# Patient Record
Sex: FEMALE | Race: BLACK OR AFRICAN AMERICAN | Employment: OTHER | URBAN - METROPOLITAN AREA
[De-identification: names, ages, dates, MRNs, and addresses within clinical notes are randomized per-mention and may not be internally consistent; named-entity substitution may affect disease eponyms.]

---

## 2018-05-19 ENCOUNTER — HOSPITAL ENCOUNTER (EMERGENCY)
Facility: HOSPITAL | Age: 83
Discharge: HOME | End: 2018-05-19
Attending: EMERGENCY MEDICINE
Payer: MEDICARE

## 2018-05-19 VITALS
DIASTOLIC BLOOD PRESSURE: 56 MMHG | HEIGHT: 62 IN | BODY MASS INDEX: 27.79 KG/M2 | OXYGEN SATURATION: 98 % | HEART RATE: 60 BPM | TEMPERATURE: 99 F | RESPIRATION RATE: 18 BRPM | WEIGHT: 151 LBS | SYSTOLIC BLOOD PRESSURE: 170 MMHG

## 2018-05-19 DIAGNOSIS — K59.00 CONSTIPATION, UNSPECIFIED CONSTIPATION TYPE: Primary | ICD-10-CM

## 2018-05-19 PROCEDURE — 99283 EMERGENCY DEPT VISIT LOW MDM: CPT

## 2018-05-19 NOTE — ED INITIAL ASSESSMENT (HPI)
Pt brought in by EMS for rectal. Pt reports pain around rectum starting yesterday, took norco at 0250 without relief. Denies blood.

## 2018-05-19 NOTE — ED NOTES
NH aware of pt return. Report given to medics. Pt safe to d/c home per MD, able to dress self.  D/C teaching completed, pt verbalizes understanding, ambulatory with steady gait to exit

## 2018-05-19 NOTE — ED PROVIDER NOTES
Patient Seen in: [facility] Emergency Department    History   Patient presents with:  Pain (neurologic)      HPI    Patient presents to the ED complaining of rectal pain.   She states that the pain started yesterday and has been taking Norco withou distress. Abdominal: Soft. She exhibits no distension. There is no tenderness. Genitourinary:   Genitourinary Comments: No evidence for hemorrhoids, fissures or specific tenderness on digital rectal examination.   There is no fluctuance of the rectal ca back to her care facility. Additional verbal instructions were given and discussed with the patient and/or caregiver.     Condition upon leaving the department: Stable    Disposition and Plan     Clinical Impression:  Constipation, unspecified constipati

## 2018-07-26 ENCOUNTER — APPOINTMENT (OUTPATIENT)
Dept: GENERAL RADIOLOGY | Facility: HOSPITAL | Age: 83
DRG: 291 | End: 2018-07-26
Attending: EMERGENCY MEDICINE
Payer: MEDICARE

## 2018-07-26 ENCOUNTER — HOSPITAL ENCOUNTER (INPATIENT)
Facility: HOSPITAL | Age: 83
LOS: 3 days | Discharge: NURSING, REHAB OR LONG-TERM CARE | DRG: 291 | End: 2018-08-01
Attending: EMERGENCY MEDICINE | Admitting: INTERNAL MEDICINE
Payer: MEDICARE

## 2018-07-26 DIAGNOSIS — R07.9 ACUTE CHEST PAIN: Primary | ICD-10-CM

## 2018-07-26 DIAGNOSIS — N30.01 ACUTE CYSTITIS WITH HEMATURIA: ICD-10-CM

## 2018-07-26 DIAGNOSIS — I50.9 ACUTE ON CHRONIC CONGESTIVE HEART FAILURE, UNSPECIFIED HEART FAILURE TYPE (HCC): ICD-10-CM

## 2018-07-26 DIAGNOSIS — R77.8 ELEVATED TROPONIN: ICD-10-CM

## 2018-07-26 PROCEDURE — 84484 ASSAY OF TROPONIN QUANT: CPT | Performed by: EMERGENCY MEDICINE

## 2018-07-26 PROCEDURE — 83880 ASSAY OF NATRIURETIC PEPTIDE: CPT | Performed by: EMERGENCY MEDICINE

## 2018-07-26 PROCEDURE — 85025 COMPLETE CBC W/AUTO DIFF WBC: CPT | Performed by: EMERGENCY MEDICINE

## 2018-07-26 PROCEDURE — 93005 ELECTROCARDIOGRAM TRACING: CPT

## 2018-07-26 PROCEDURE — 93010 ELECTROCARDIOGRAM REPORT: CPT | Performed by: EMERGENCY MEDICINE

## 2018-07-26 PROCEDURE — 99285 EMERGENCY DEPT VISIT HI MDM: CPT

## 2018-07-26 PROCEDURE — 80048 BASIC METABOLIC PNL TOTAL CA: CPT | Performed by: EMERGENCY MEDICINE

## 2018-07-26 PROCEDURE — 96374 THER/PROPH/DIAG INJ IV PUSH: CPT

## 2018-07-26 PROCEDURE — 71045 X-RAY EXAM CHEST 1 VIEW: CPT | Performed by: EMERGENCY MEDICINE

## 2018-07-26 PROCEDURE — 96375 TX/PRO/DX INJ NEW DRUG ADDON: CPT

## 2018-07-26 PROCEDURE — 80061 LIPID PANEL: CPT | Performed by: EMERGENCY MEDICINE

## 2018-07-27 ENCOUNTER — APPOINTMENT (OUTPATIENT)
Dept: CT IMAGING | Facility: HOSPITAL | Age: 83
DRG: 291 | End: 2018-07-27
Attending: EMERGENCY MEDICINE
Payer: MEDICARE

## 2018-07-27 PROBLEM — N30.01 ACUTE CYSTITIS WITH HEMATURIA: Status: ACTIVE | Noted: 2018-07-27

## 2018-07-27 PROBLEM — R07.9 ACUTE CHEST PAIN: Status: ACTIVE | Noted: 2018-07-27

## 2018-07-27 PROBLEM — R77.8 ELEVATED TROPONIN: Status: ACTIVE | Noted: 2018-07-27

## 2018-07-27 PROBLEM — I50.9 ACUTE ON CHRONIC CONGESTIVE HEART FAILURE, UNSPECIFIED HEART FAILURE TYPE (HCC): Status: ACTIVE | Noted: 2018-07-27

## 2018-07-27 LAB
ANION GAP SERPL CALC-SCNC: 8 MMOL/L (ref 0–18)
BASOPHILS # BLD: 0.1 K/UL (ref 0–0.2)
BASOPHILS NFR BLD: 1 %
BILIRUB UR QL: NEGATIVE
BNP SERPL-MCNC: 2373 PG/ML (ref 0–100)
BUN SERPL-MCNC: 17 MG/DL (ref 8–20)
BUN/CREAT SERPL: 5.4 (ref 10–20)
CALCIUM SERPL-MCNC: 9.3 MG/DL (ref 8.5–10.5)
CHLORIDE SERPL-SCNC: 105 MMOL/L (ref 95–110)
CHOLEST SERPL-MCNC: 132 MG/DL (ref 110–200)
CO2 SERPL-SCNC: 31 MMOL/L (ref 22–32)
COLOR UR: YELLOW
CREAT SERPL-MCNC: 3.16 MG/DL (ref 0.5–1.5)
EOSINOPHIL # BLD: 0.2 K/UL (ref 0–0.7)
EOSINOPHIL NFR BLD: 3 %
ERYTHROCYTE [DISTWIDTH] IN BLOOD BY AUTOMATED COUNT: 17.4 % (ref 11–15)
GLUCOSE BLDC GLUCOMTR-MCNC: 102 MG/DL (ref 70–99)
GLUCOSE BLDC GLUCOMTR-MCNC: 130 MG/DL (ref 70–99)
GLUCOSE BLDC GLUCOMTR-MCNC: 76 MG/DL (ref 70–99)
GLUCOSE BLDC GLUCOMTR-MCNC: 79 MG/DL (ref 70–99)
GLUCOSE BLDC GLUCOMTR-MCNC: 84 MG/DL (ref 70–99)
GLUCOSE SERPL-MCNC: 136 MG/DL (ref 70–99)
GLUCOSE UR-MCNC: 50 MG/DL
HBA1C MFR BLD: 4.4 % (ref 4–6)
HCT VFR BLD AUTO: 34.2 % (ref 35–48)
HDLC SERPL-MCNC: 44 MG/DL
HGB BLD-MCNC: 10.8 G/DL (ref 12–16)
IRON SATN MFR SERPL: 17 % (ref 15–50)
IRON SERPL-MCNC: 28 MCG/DL (ref 28–170)
KETONES UR-MCNC: NEGATIVE MG/DL
LDLC SERPL CALC-MCNC: 54 MG/DL (ref 0–99)
LYMPHOCYTES # BLD: 0.6 K/UL (ref 1–4)
LYMPHOCYTES NFR BLD: 9 %
MCH RBC QN AUTO: 28.9 PG (ref 27–32)
MCHC RBC AUTO-ENTMCNC: 31.6 G/DL (ref 32–37)
MCV RBC AUTO: 91.3 FL (ref 80–100)
MONOCYTES # BLD: 0.5 K/UL (ref 0–1)
MONOCYTES NFR BLD: 7 %
NEUTROPHILS # BLD AUTO: 5.3 K/UL (ref 1.8–7.7)
NEUTROPHILS NFR BLD: 79 %
NITRITE UR QL STRIP.AUTO: NEGATIVE
NONHDLC SERPL-MCNC: 88 MG/DL
OSMOLALITY UR CALC.SUM OF ELEC: 302 MOSM/KG (ref 275–295)
PH UR: 9 [PH] (ref 5–8)
PLATELET # BLD AUTO: 139 K/UL (ref 140–400)
PMV BLD AUTO: 9.1 FL (ref 7.4–10.3)
POTASSIUM SERPL-SCNC: 3.9 MMOL/L (ref 3.3–5.1)
PROT UR-MCNC: 100 MG/DL
RBC # BLD AUTO: 3.74 M/UL (ref 3.7–5.4)
RBC #/AREA URNS AUTO: 33 /HPF
SODIUM SERPL-SCNC: 144 MMOL/L (ref 136–144)
SP GR UR STRIP: 1.01 (ref 1–1.03)
TIBC SERPL-MCNC: 165 MCG/DL (ref 228–428)
TRANSFERRIN SERPL-MCNC: 125 MG/DL (ref 192–382)
TRIGL SERPL-MCNC: 170 MG/DL (ref 1–149)
TROPONIN I SERPL-MCNC: 0.04 NG/ML (ref ?–0.03)
TROPONIN I SERPL-MCNC: 0.04 NG/ML (ref ?–0.03)
UROBILINOGEN UR STRIP-ACNC: <2
VIT C UR-MCNC: NEGATIVE MG/DL
WBC # BLD AUTO: 6.7 K/UL (ref 4–11)
WBC #/AREA URNS AUTO: 167 /HPF

## 2018-07-27 PROCEDURE — 70450 CT HEAD/BRAIN W/O DYE: CPT | Performed by: EMERGENCY MEDICINE

## 2018-07-27 PROCEDURE — 87086 URINE CULTURE/COLONY COUNT: CPT | Performed by: EMERGENCY MEDICINE

## 2018-07-27 PROCEDURE — 83540 ASSAY OF IRON: CPT | Performed by: FAMILY MEDICINE

## 2018-07-27 PROCEDURE — 93005 ELECTROCARDIOGRAM TRACING: CPT

## 2018-07-27 PROCEDURE — 93010 ELECTROCARDIOGRAM REPORT: CPT | Performed by: INTERNAL MEDICINE

## 2018-07-27 PROCEDURE — A4216 STERILE WATER/SALINE, 10 ML: HCPCS

## 2018-07-27 PROCEDURE — 83036 HEMOGLOBIN GLYCOSYLATED A1C: CPT | Performed by: INTERNAL MEDICINE

## 2018-07-27 PROCEDURE — 81001 URINALYSIS AUTO W/SCOPE: CPT | Performed by: EMERGENCY MEDICINE

## 2018-07-27 PROCEDURE — 82962 GLUCOSE BLOOD TEST: CPT

## 2018-07-27 PROCEDURE — 84466 ASSAY OF TRANSFERRIN: CPT | Performed by: FAMILY MEDICINE

## 2018-07-27 PROCEDURE — 84484 ASSAY OF TROPONIN QUANT: CPT | Performed by: INTERNAL MEDICINE

## 2018-07-27 RX ORDER — ATORVASTATIN CALCIUM 40 MG/1
80 TABLET, FILM COATED ORAL NIGHTLY
Status: DISCONTINUED | OUTPATIENT
Start: 2018-07-27 | End: 2018-08-01

## 2018-07-27 RX ORDER — LEVOCARNITINE 330 MG/1
330 TABLET ORAL DAILY
Status: DISCONTINUED | OUTPATIENT
Start: 2018-07-27 | End: 2018-07-27 | Stop reason: SDUPTHER

## 2018-07-27 RX ORDER — AMLODIPINE BESYLATE 5 MG/1
5 TABLET ORAL DAILY
Status: DISCONTINUED | OUTPATIENT
Start: 2018-07-27 | End: 2018-07-28

## 2018-07-27 RX ORDER — TRAMADOL HYDROCHLORIDE 50 MG/1
50 TABLET ORAL EVERY 12 HOURS PRN
Status: DISCONTINUED | OUTPATIENT
Start: 2018-07-27 | End: 2018-08-01

## 2018-07-27 RX ORDER — GABAPENTIN 300 MG/1
300 CAPSULE ORAL 3 TIMES DAILY
Status: ON HOLD | COMMUNITY
End: 2018-07-30

## 2018-07-27 RX ORDER — ASPIRIN 81 MG/1
81 TABLET, CHEWABLE ORAL DAILY
Status: DISCONTINUED | OUTPATIENT
Start: 2018-07-27 | End: 2018-08-01

## 2018-07-27 RX ORDER — SENNOSIDES 8.8 MG/5ML
5 LIQUID ORAL 2 TIMES DAILY
COMMUNITY

## 2018-07-27 RX ORDER — CHOLECALCIFEROL (VITAMIN D3) 1250 MCG
50000 CAPSULE ORAL WEEKLY
COMMUNITY

## 2018-07-27 RX ORDER — LEVOCARNITINE 1 G/10ML
330 SOLUTION ORAL DAILY
Status: ON HOLD | COMMUNITY
End: 2019-03-01

## 2018-07-27 RX ORDER — FUROSEMIDE 10 MG/ML
40 INJECTION INTRAMUSCULAR; INTRAVENOUS ONCE
Status: COMPLETED | OUTPATIENT
Start: 2018-07-27 | End: 2018-07-27

## 2018-07-27 RX ORDER — HEPARIN SODIUM 5000 [USP'U]/ML
5000 INJECTION, SOLUTION INTRAVENOUS; SUBCUTANEOUS EVERY 12 HOURS SCHEDULED
Status: DISCONTINUED | OUTPATIENT
Start: 2018-07-27 | End: 2018-08-01

## 2018-07-27 RX ORDER — LEVOCARNITINE 1 G/10ML
330 SOLUTION ORAL DAILY
Status: DISCONTINUED | OUTPATIENT
Start: 2018-07-27 | End: 2018-08-01

## 2018-07-27 RX ORDER — IPRATROPIUM BROMIDE AND ALBUTEROL SULFATE 2.5; .5 MG/3ML; MG/3ML
3 SOLUTION RESPIRATORY (INHALATION) EVERY 4 HOURS PRN
Status: ON HOLD | COMMUNITY
End: 2019-03-01

## 2018-07-27 RX ORDER — ATORVASTATIN CALCIUM 40 MG/1
40 TABLET, FILM COATED ORAL NIGHTLY
Status: DISCONTINUED | OUTPATIENT
Start: 2018-07-27 | End: 2018-07-27

## 2018-07-27 RX ORDER — HYDRALAZINE HYDROCHLORIDE 20 MG/ML
20 INJECTION INTRAMUSCULAR; INTRAVENOUS EVERY 6 HOURS PRN
Status: DISCONTINUED | OUTPATIENT
Start: 2018-07-27 | End: 2018-08-01

## 2018-07-27 RX ORDER — 0.9 % SODIUM CHLORIDE 0.9 %
VIAL (ML) INJECTION
Status: COMPLETED
Start: 2018-07-27 | End: 2018-07-27

## 2018-07-27 RX ORDER — FUROSEMIDE 10 MG/ML
40 INJECTION INTRAMUSCULAR; INTRAVENOUS
Status: DISCONTINUED | OUTPATIENT
Start: 2018-07-27 | End: 2018-07-28

## 2018-07-27 RX ORDER — ATORVASTATIN CALCIUM 40 MG/1
40 TABLET, FILM COATED ORAL NIGHTLY
Status: ON HOLD | COMMUNITY
End: 2018-08-01

## 2018-07-27 RX ORDER — FUROSEMIDE 10 MG/ML
40 INJECTION INTRAMUSCULAR; INTRAVENOUS DAILY
Status: DISCONTINUED | OUTPATIENT
Start: 2018-07-27 | End: 2018-07-27

## 2018-07-27 RX ORDER — ASPIRIN 81 MG/1
324 TABLET, CHEWABLE ORAL ONCE
Status: COMPLETED | OUTPATIENT
Start: 2018-07-27 | End: 2018-07-27

## 2018-07-27 RX ORDER — GUAIFENESIN 100 MG/5ML
100 SOLUTION ORAL EVERY 4 HOURS PRN
COMMUNITY

## 2018-07-27 RX ORDER — TRAMADOL HYDROCHLORIDE 50 MG/1
50 TABLET ORAL EVERY 6 HOURS PRN
Status: ON HOLD | COMMUNITY
End: 2019-03-29

## 2018-07-27 RX ORDER — IPRATROPIUM BROMIDE AND ALBUTEROL SULFATE 2.5; .5 MG/3ML; MG/3ML
3 SOLUTION RESPIRATORY (INHALATION) EVERY 4 HOURS PRN
Status: DISCONTINUED | OUTPATIENT
Start: 2018-07-27 | End: 2018-08-01

## 2018-07-27 RX ORDER — 0.9 % SODIUM CHLORIDE 0.9 %
3 VIAL (ML) INJECTION AS NEEDED
Status: DISCONTINUED | OUTPATIENT
Start: 2018-07-27 | End: 2018-08-01

## 2018-07-27 RX ORDER — POLYETHYLENE GLYCOL 3350 17 G/17G
17 POWDER, FOR SOLUTION ORAL DAILY
COMMUNITY

## 2018-07-27 NOTE — CM/SW NOTE
SW self-referred to meet with patient due to case findings and diagnosis. Pt is from [facility]. SW sent updates via [employer]/[place]. / to remain available for support and/or discharge planning.      Renato Downs  X1

## 2018-07-27 NOTE — PLAN OF CARE
CARDIOLOGY AND NEPHROLOGY CONSULTS TODAY, DIALYSIS SCHEDULED FOR TOMORROW, 2D ECHO ORDERED FOR TOMORROW, MEDICATION ADJUSTMENTS,  [facility]

## 2018-07-27 NOTE — CONSULTS
[facility]    Report of Consultation    Sabine Christian Patient Status:  Observation    1935 MRN [id number]   Location [facility] 3W/SW Attending Gina Ribeiro, *   Hosp Day # 0 PCP No primary care provider on file. times per day   levOCARNitine (CARNITOR) 1 GM/10ML solution 330 mg 330 mg Oral Daily       Prescriptions Prior to Admission:  TraMADol HCl 50 MG Oral Tab Take 50 mg by mouth every 6 (six) hours as needed for Pain.   levOCARNitine 1 GM/10ML Oral Solution Ta 07/26/2018    07/26/2018   K 3.9 07/26/2018    07/26/2018   CO2 31 07/26/2018    (H) 07/26/2018   CA 9.3 07/26/2018   ALB 3.8 11/30/2016   ALKPHO 295 (H) 11/30/2016   TP 7.6 11/30/2016   AST 14 (L) 11/30/2016   ALT 10 (L) 11/30/2016   TR admitted for Chest pain. 1.  ESRD on HD, TTS       2. Anemia of CKD      3. Acute chest pain      4. Elevated troponin      5. Acute on chronic congestive heart failure, unspecified heart failure type ([address]      6.   Acute cystitis with hematuria

## 2018-07-27 NOTE — ED PROVIDER NOTES
Patient endorsed pending urinalysis and CT prior to admission. CT with no acute intracranial findings other than possible sinusitis. Urinalysis with evidence for UTI, will cover Rocephin.     Preliminary Radiology Report  [facility], [facility]  [phone]

## 2018-07-27 NOTE — PROGRESS NOTES
Critical access hospital Pharmacy Note:  Renal Dose Adjustment for Tramadol Sravanthi Perez    Bharat Villatoro has been prescribed Tramadol (ULTRAM) 50 mg orally every 6 hours as needed for pain. Estimated Creatinine Clearance: 12.4 mL/min (A) (based on SCr of 3.16 mg/dL (H)).     Her

## 2018-07-27 NOTE — CONSULTS
[facility]    Report of Consultation    Kristyn Limon Patient Status:  Observation    1935 MRN [id number]   Location [facility] 3W/SW Attending Khadra Valle, *   Hosp Day # 0 PCP No primary care provider on file. injection 5,000 Units 5,000 Units Subcutaneous 2 times per day   levOCARNitine (CARNITOR) 1 GM/10ML solution 330 mg 330 mg Oral Daily   Sodium Chloride 0.9 % solution 3 mL 3 mL Intravenous PRN       Prescriptions Prior to Admission:  TraMADol HCl 50 MG Ora (Porcine)  5,000 Units Subcutaneous 2 times per day   • levOCARNitine  330 mg Oral Daily       Continuous Infusions:     General appearance:  alert, appears stated age and cooperative  Pulmonary: B/L wheezing  Cardiovascular: S1, S2 normal, no murmur, clic at 6:57          Xr Chest Ap Portable  (cpt=71045)    Result Date: 7/27/2018  CONCLUSION:  1. Limited inspiration. 2. Prominent markings perhaps secondary to the poor inspiration. CHF? 3. Cardiomegaly. 4. Atherosclerosis. 5. Demineralization. 6. Scoliosis.

## 2018-07-27 NOTE — ED PROVIDER NOTES
Patient Seen in: [facility] AND [facility] Emergency Department    History   Patient presents with:  Chest Pain Angina (cardiovascular)    Stated Complaint:     HPI    Patient is an 27-year-old female from [facility] who presents with chest pain started earlier th distress, awake and alert  HEENT: MMM, EOMI, PERRL  Neck: supple, non tender, no jvd  CV: RRR, no murmurs. +chest wall port  Resp: +audible expiratory wheezing  Ab: soft, nontender, no distension  Extremities: limited movement due to pain LUE. +left BKA. 0033  ------------------------------------------------------------      MDM           Chest, one view, at 2323 hrs. Comparison: 6/9/16    IMPRESSION:    Lung volumes are low, limiting sensitivity and specificity. Status post median sternotomy.   Mild ca

## 2018-07-27 NOTE — ED INITIAL ASSESSMENT (HPI)
Pt c/o CP at 0945 this AM that lasted 3-4 minutes, described as 5/10 and sharp. Pt denies current CP/SOB. No distress noted. Pt a/o x4/4.

## 2018-07-27 NOTE — H&P
[facility] Patient Status:  Observation    1935 MRN [id number]   Location [facility] 3W/SW Attending Ciarra Sahni, *   Hosp Day # 0 PCP No primary care provider on file.      Aiden MG/5ML Oral Solution Take 100 mg by mouth every 4 (four) hours as needed. gabapentin 300 MG Oral Cap Take 300 mg by mouth 3 (three) times daily. PEG 3350 Oral Powd Pack Take 17 g by mouth daily.    ipratropium-albuterol 0.5-2.5 (3) MG/3ML Inhalation Sol 7/27/2018  ECG Report  Interpretation  --------------------------     Ekg 12-lead    Result Date: 7/26/2018  ECG Report  Interpretation  --------------------------       Assessment/Plan:       Atypical chest pain  –EKG without acute changes  –Stable tropon

## 2018-07-27 NOTE — CM/SW NOTE
SW self-referred to meet with patient due to case findings and diagnosis. Pt is from [address] [place] [address] since [facility]. FRANCHESCA spoke with pt's daughter Derrick Kim [phone] who states that she wants her mother transferred to [facility] [phone].    P

## 2018-07-28 ENCOUNTER — APPOINTMENT (OUTPATIENT)
Dept: CV DIAGNOSTICS | Facility: HOSPITAL | Age: 83
DRG: 291 | End: 2018-07-28
Attending: HOSPITALIST
Payer: MEDICARE

## 2018-07-28 LAB
ANION GAP SERPL CALC-SCNC: 10 MMOL/L (ref 0–18)
BASOPHILS # BLD: 0.1 K/UL (ref 0–0.2)
BASOPHILS NFR BLD: 1 %
BUN SERPL-MCNC: 24 MG/DL (ref 8–20)
BUN/CREAT SERPL: 5.7 (ref 10–20)
CALCIUM SERPL-MCNC: 9.3 MG/DL (ref 8.5–10.5)
CHLORIDE SERPL-SCNC: 105 MMOL/L (ref 95–110)
CO2 SERPL-SCNC: 29 MMOL/L (ref 22–32)
CREAT SERPL-MCNC: 4.18 MG/DL (ref 0.5–1.5)
EOSINOPHIL # BLD: 0.4 K/UL (ref 0–0.7)
EOSINOPHIL NFR BLD: 7 %
ERYTHROCYTE [DISTWIDTH] IN BLOOD BY AUTOMATED COUNT: 17.6 % (ref 11–15)
GLUCOSE BLDC GLUCOMTR-MCNC: 120 MG/DL (ref 70–99)
GLUCOSE BLDC GLUCOMTR-MCNC: 81 MG/DL (ref 70–99)
GLUCOSE BLDC GLUCOMTR-MCNC: 90 MG/DL (ref 70–99)
GLUCOSE SERPL-MCNC: 75 MG/DL (ref 70–99)
HCT VFR BLD AUTO: 30.8 % (ref 35–48)
HGB BLD-MCNC: 9.8 G/DL (ref 12–16)
LYMPHOCYTES # BLD: 1.1 K/UL (ref 1–4)
LYMPHOCYTES NFR BLD: 21 %
MCH RBC QN AUTO: 29.4 PG (ref 27–32)
MCHC RBC AUTO-ENTMCNC: 31.9 G/DL (ref 32–37)
MCV RBC AUTO: 92.3 FL (ref 80–100)
MONOCYTES # BLD: 0.6 K/UL (ref 0–1)
MONOCYTES NFR BLD: 12 %
NEUTROPHILS # BLD AUTO: 3.1 K/UL (ref 1.8–7.7)
NEUTROPHILS NFR BLD: 60 %
OSMOLALITY UR CALC.SUM OF ELEC: 301 MOSM/KG (ref 275–295)
PLATELET # BLD AUTO: 166 K/UL (ref 140–400)
PMV BLD AUTO: 9.7 FL (ref 7.4–10.3)
POTASSIUM SERPL-SCNC: 4.6 MMOL/L (ref 3.3–5.1)
RBC # BLD AUTO: 3.33 M/UL (ref 3.7–5.4)
SODIUM SERPL-SCNC: 144 MMOL/L (ref 136–144)
WBC # BLD AUTO: 5.2 K/UL (ref 4–11)

## 2018-07-28 PROCEDURE — 90935 HEMODIALYSIS ONE EVALUATION: CPT

## 2018-07-28 PROCEDURE — 93306 TTE W/DOPPLER COMPLETE: CPT | Performed by: HOSPITALIST

## 2018-07-28 PROCEDURE — B246YZZ ULTRASONOGRAPHY OF RIGHT AND LEFT HEART USING OTHER CONTRAST: ICD-10-PCS | Performed by: INTERNAL MEDICINE

## 2018-07-28 PROCEDURE — 5A1D70Z PERFORMANCE OF URINARY FILTRATION, INTERMITTENT, LESS THAN 6 HOURS PER DAY: ICD-10-PCS | Performed by: INTERNAL MEDICINE

## 2018-07-28 PROCEDURE — 82962 GLUCOSE BLOOD TEST: CPT

## 2018-07-28 PROCEDURE — 80048 BASIC METABOLIC PNL TOTAL CA: CPT | Performed by: INTERNAL MEDICINE

## 2018-07-28 PROCEDURE — 85025 COMPLETE CBC W/AUTO DIFF WBC: CPT | Performed by: INTERNAL MEDICINE

## 2018-07-28 PROCEDURE — A4216 STERILE WATER/SALINE, 10 ML: HCPCS | Performed by: INTERNAL MEDICINE

## 2018-07-28 RX ORDER — FUROSEMIDE 10 MG/ML
80 INJECTION INTRAMUSCULAR; INTRAVENOUS
Status: DISCONTINUED | OUTPATIENT
Start: 2018-07-29 | End: 2018-07-31

## 2018-07-28 RX ORDER — AMLODIPINE BESYLATE 10 MG/1
10 TABLET ORAL DAILY
Status: DISCONTINUED | OUTPATIENT
Start: 2018-07-29 | End: 2018-08-01

## 2018-07-28 RX ORDER — AMLODIPINE BESYLATE 5 MG/1
5 TABLET ORAL ONCE
Status: COMPLETED | OUTPATIENT
Start: 2018-07-28 | End: 2018-07-28

## 2018-07-28 RX ORDER — SODIUM CHLORIDE 9 MG/ML
INJECTION, SOLUTION INTRAVENOUS
Status: COMPLETED
Start: 2018-07-28 | End: 2018-07-28

## 2018-07-28 NOTE — PROGRESS NOTES
[place] [facility]    Progress Note    Valdemarcathie Browngeorgette Patient Status:  Observation    1935 MRN [id number]   Location [facility] 3W/SW Attending Donnie Faustin, *   Hosp Day # 0 PCP No primary care provider on file.        SUBJ 5,000 Units 5,000 Units Subcutaneous 2 times per day   levOCARNitine (CARNITOR) 1 GM/10ML solution 330 mg 330 mg Oral Daily   Sodium Chloride 0.9 % solution 3 mL 3 mL Intravenous PRN   aspirin chewable tab 81 mg 81 mg Oral Daily   furosemide (LASIX) inject

## 2018-07-28 NOTE — PROGRESS NOTES
[facility]  Progress Note    Jose Antonio Barrett Patient Status:  Observation    1935 MRN [id number]   Location [facility] 3W/SW Attending Braxton Todd, *   Hosp Day # 0 PCP No primary care provider on file.      Subjective:  Jessieen - Negative T-waves - Lateral ischemia. ABNORMAL    CXR 7/26/18  CONCLUSION:   1. Limited inspiration. 2. Prominent markings perhaps secondary to the poor inspiration. CHF? 3. Cardiomegaly. 4. Atherosclerosis. 5. Demineralization. 6. Scoliosis.   7. Os TraMADol HCl (ULTRAM) tab 50 mg 50 mg Oral Q12H PRN   Heparin Sodium (Porcine) 5000 UNIT/ML injection 5,000 Units 5,000 Units Subcutaneous 2 times per day   levOCARNitine (CARNITOR) 1 GM/10ML solution 330 mg 330 mg Oral Daily   Sodium Chloride 0.9 % soluti

## 2018-07-28 NOTE — BH PSYCHOSOCIAL ASSESSMENT
[address] Patient Status:  Observation    1935 MRN [id number]   Location [facility] 3W/SW Attending Roosevelt Caraballo, *   Hosp Day # 0 PCP No primary care provider on file.      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 Moderately advanced chronic appearing deep white matter ischemic change in the periventricular white matter   bilaterally.   3. Moderate-sized air-fluid levels in the bilateral sphenoi

## 2018-07-28 NOTE — PLAN OF CARE
DIALYSIS THIS MORNING, 2L REMOVED, 2D ECHO TODAY, STRESS TEST ORDERED FOR TOMORROW, CONTINUING IVP LASIX, BUT NO URINE OUTPUT

## 2018-07-28 NOTE — PROGRESS NOTES
[facility]    Progress Note    Alton Austin Patient Status:  Observation    1935 MRN [id number]   Location [facility] 3W/SW Attending Sedonia Clock, *   Hosp Day # 0 PCP No primary care provider on file.        Subjec compression of the undersurface of the optic chiasm. Recommend MRI scanning with contrast to further assess since underlying additional mass, meningioma or aneurysm cannot be excluded with certainty.  2. No intracranial hemorrhage, new infarct or skull frac ischemia.  ABNORMAL When compared with ECG of 11/30/2016 23:11:28 sinus is no longer seen Electronically signed on 07/27/2018 at 17:40 by Ronald Lennon MD  7/28/2018

## 2018-07-29 LAB
ANION GAP SERPL CALC-SCNC: 8 MMOL/L (ref 0–18)
BASOPHILS # BLD: 0.1 K/UL (ref 0–0.2)
BASOPHILS NFR BLD: 1 %
BUN SERPL-MCNC: 14 MG/DL (ref 8–20)
BUN/CREAT SERPL: 4.7 (ref 10–20)
CALCIUM SERPL-MCNC: 9.6 MG/DL (ref 8.5–10.5)
CHLORIDE SERPL-SCNC: 104 MMOL/L (ref 95–110)
CO2 SERPL-SCNC: 27 MMOL/L (ref 22–32)
CREAT SERPL-MCNC: 2.95 MG/DL (ref 0.5–1.5)
EOSINOPHIL # BLD: 0.4 K/UL (ref 0–0.7)
EOSINOPHIL NFR BLD: 5 %
ERYTHROCYTE [DISTWIDTH] IN BLOOD BY AUTOMATED COUNT: 18 % (ref 11–15)
GLUCOSE BLDC GLUCOMTR-MCNC: 126 MG/DL (ref 70–99)
GLUCOSE BLDC GLUCOMTR-MCNC: 89 MG/DL (ref 70–99)
GLUCOSE BLDC GLUCOMTR-MCNC: 95 MG/DL (ref 70–99)
GLUCOSE BLDC GLUCOMTR-MCNC: 98 MG/DL (ref 70–99)
GLUCOSE SERPL-MCNC: 105 MG/DL (ref 70–99)
HCT VFR BLD AUTO: 33.6 % (ref 35–48)
HGB BLD-MCNC: 10.7 G/DL (ref 12–16)
LYMPHOCYTES # BLD: 1.3 K/UL (ref 1–4)
LYMPHOCYTES NFR BLD: 20 %
MCH RBC QN AUTO: 29.2 PG (ref 27–32)
MCHC RBC AUTO-ENTMCNC: 31.8 G/DL (ref 32–37)
MCV RBC AUTO: 91.9 FL (ref 80–100)
MONOCYTES # BLD: 0.6 K/UL (ref 0–1)
MONOCYTES NFR BLD: 9 %
NEUTROPHILS # BLD AUTO: 4.2 K/UL (ref 1.8–7.7)
NEUTROPHILS NFR BLD: 64 %
OSMOLALITY UR CALC.SUM OF ELEC: 289 MOSM/KG (ref 275–295)
PLATELET # BLD AUTO: 159 K/UL (ref 140–400)
PMV BLD AUTO: 10 FL (ref 7.4–10.3)
POTASSIUM SERPL-SCNC: 4.1 MMOL/L (ref 3.3–5.1)
RBC # BLD AUTO: 3.66 M/UL (ref 3.7–5.4)
SODIUM SERPL-SCNC: 139 MMOL/L (ref 136–144)
WBC # BLD AUTO: 6.5 K/UL (ref 4–11)

## 2018-07-29 PROCEDURE — 94640 AIRWAY INHALATION TREATMENT: CPT

## 2018-07-29 PROCEDURE — 83970 ASSAY OF PARATHORMONE: CPT | Performed by: INTERNAL MEDICINE

## 2018-07-29 PROCEDURE — 80048 BASIC METABOLIC PNL TOTAL CA: CPT | Performed by: INTERNAL MEDICINE

## 2018-07-29 PROCEDURE — A4216 STERILE WATER/SALINE, 10 ML: HCPCS | Performed by: INTERNAL MEDICINE

## 2018-07-29 PROCEDURE — 3E0F7GC INTRODUCTION OF OTHER THERAPEUTIC SUBSTANCE INTO RESPIRATORY TRACT, VIA NATURAL OR ARTIFICIAL OPENING: ICD-10-PCS | Performed by: INTERNAL MEDICINE

## 2018-07-29 PROCEDURE — 82962 GLUCOSE BLOOD TEST: CPT

## 2018-07-29 PROCEDURE — 85025 COMPLETE CBC W/AUTO DIFF WBC: CPT | Performed by: INTERNAL MEDICINE

## 2018-07-29 RX ORDER — SEVELAMER CARBONATE 800 MG/1
800 TABLET, FILM COATED ORAL
Status: ON HOLD | COMMUNITY
End: 2019-03-01

## 2018-07-29 RX ORDER — ALLOPURINOL 100 MG/1
100 TABLET ORAL DAILY
COMMUNITY

## 2018-07-29 RX ORDER — HYDROCODONE BITARTRATE AND ACETAMINOPHEN 5; 325 MG/1; MG/1
1 TABLET ORAL EVERY 4 HOURS PRN
Status: ON HOLD | COMMUNITY
End: 2019-03-29

## 2018-07-29 RX ORDER — ISOSORBIDE MONONITRATE 30 MG/1
30 TABLET, EXTENDED RELEASE ORAL DAILY
Status: DISCONTINUED | OUTPATIENT
Start: 2018-07-29 | End: 2018-08-01

## 2018-07-29 RX ORDER — MELATONIN
325 2 TIMES DAILY WITH MEALS
COMMUNITY

## 2018-07-29 RX ORDER — VIT B COMP NO.3/FOLIC/C/BIOTIN 1 MG-60 MG
1 TABLET ORAL
COMMUNITY

## 2018-07-29 RX ORDER — DOCUSATE SODIUM 100 MG/1
100 CAPSULE, LIQUID FILLED ORAL DAILY
COMMUNITY

## 2018-07-29 RX ORDER — HYDRALAZINE HYDROCHLORIDE 25 MG/1
25 TABLET, FILM COATED ORAL 3 TIMES DAILY
Status: DISCONTINUED | OUTPATIENT
Start: 2018-07-29 | End: 2018-07-30

## 2018-07-29 RX ORDER — HYDRALAZINE HYDROCHLORIDE 25 MG/1
25 TABLET, FILM COATED ORAL 3 TIMES DAILY
Status: ON HOLD | COMMUNITY
End: 2018-08-01

## 2018-07-29 RX ORDER — HYDROCODONE BITARTRATE AND ACETAMINOPHEN 5; 325 MG/1; MG/1
1 TABLET ORAL EVERY 6 HOURS PRN
Status: DISCONTINUED | OUTPATIENT
Start: 2018-07-29 | End: 2018-08-01

## 2018-07-29 NOTE — PROGRESS NOTES
[place] [facility]    Progress Note    Peola Rho Patient Status:  Observation    1935 MRN [id number]   Location [facility] 3W/SW Attending Vela Kayser, *   Hosp Day # 0 PCP No primary care provider on file.        Subjec

## 2018-07-29 NOTE — PROGRESS NOTES
07/29/18 1101   Clinical Encounter Type   Visited With Patient   Routine Visit Introduction   Continue Visiting Yes   Referral From Nurse   Referral To [address] Needs Prayer   Spiritual Requests During Visit / Sahil Patel

## 2018-07-29 NOTE — PROGRESS NOTES
Patient seen in follow up. Patient denies any chest pain or sob. She is confused about when she had a conversation with me. Stress test cancelled as per the wishes of her daughter. 07/29/18  0908   BP: (!) 168/97   Pulse: 71   Resp: 18   Temp: 98. Sevelamer Carbonate 800 MG Oral Tab Take 800 mg by mouth 3 (three) times daily with meals. aspirin 81 MG Oral Tab Take 81 mg by mouth daily. ferrous sulfate 325 (65 FE) MG Oral Tab EC Take 325 mg by mouth 2 (two) times daily with meals.    B Complex-C-F Recent NSTEMI, trop peak at 0.8 at [facility] 6/18, patient was treated medically due to advanced age.  EF preserved on echo 7/18    CABG 2011 (LIMA to LAD, SVG to OM1, SVG to RCA)    Acute on chronic diastolic CHF, dialysis for volume management    HTN, poorly

## 2018-07-29 NOTE — PROGRESS NOTES
[facility]    Progress Note    Mariajose Ford Patient Status:  Observation    1935 MRN [id number]   Location [facility] 3W/SW Attending Emiliano Trevino, *   Hosp Day # 0 PCP No primary care provider on file.        SUBJ MBP/ADD-vantage 1 g Intravenous Q24H   ipratropium-albuterol (DUONEB) nebulizer solution 3 mL 3 mL Nebulization Q4H PRN   metoprolol Tartrate (LOPRESSOR) tab 12.5 mg 12.5 mg Oral 2x Daily(Beta Blocker)   TraMADol HCl (ULTRAM) tab 50 mg 50 mg Oral Q12H PRN

## 2018-07-29 NOTE — PLAN OF CARE
Problem: Patient Centered Care  Goal: Patient preferences are identified and integrated in the patient's plan of care  Interventions:  - What would you like us to know as we care for you? Per daughter, pt is typically very talkative and alert.   - Provide t

## 2018-07-30 LAB
ANION GAP SERPL CALC-SCNC: 7 MMOL/L (ref 0–18)
BASOPHILS # BLD: 0.1 K/UL (ref 0–0.2)
BASOPHILS NFR BLD: 1 %
BUN SERPL-MCNC: 20 MG/DL (ref 8–20)
BUN/CREAT SERPL: 5 (ref 10–20)
CALCIUM SERPL-MCNC: 9.6 MG/DL (ref 8.5–10.5)
CHLORIDE SERPL-SCNC: 103 MMOL/L (ref 95–110)
CO2 SERPL-SCNC: 28 MMOL/L (ref 22–32)
CREAT SERPL-MCNC: 4.01 MG/DL (ref 0.5–1.5)
EOSINOPHIL # BLD: 0.4 K/UL (ref 0–0.7)
EOSINOPHIL NFR BLD: 8 %
ERYTHROCYTE [DISTWIDTH] IN BLOOD BY AUTOMATED COUNT: 17.4 % (ref 11–15)
GLUCOSE BLDC GLUCOMTR-MCNC: 105 MG/DL (ref 70–99)
GLUCOSE BLDC GLUCOMTR-MCNC: 108 MG/DL (ref 70–99)
GLUCOSE BLDC GLUCOMTR-MCNC: 110 MG/DL (ref 70–99)
GLUCOSE BLDC GLUCOMTR-MCNC: 74 MG/DL (ref 70–99)
GLUCOSE BLDC GLUCOMTR-MCNC: 85 MG/DL (ref 70–99)
GLUCOSE SERPL-MCNC: 82 MG/DL (ref 70–99)
HCT VFR BLD AUTO: 33.5 % (ref 35–48)
HGB BLD-MCNC: 10.7 G/DL (ref 12–16)
LYMPHOCYTES # BLD: 1.3 K/UL (ref 1–4)
LYMPHOCYTES NFR BLD: 26 %
MCH RBC QN AUTO: 29.4 PG (ref 27–32)
MCHC RBC AUTO-ENTMCNC: 32.1 G/DL (ref 32–37)
MCV RBC AUTO: 91.6 FL (ref 80–100)
MONOCYTES # BLD: 0.5 K/UL (ref 0–1)
MONOCYTES NFR BLD: 11 %
NEUTROPHILS # BLD AUTO: 2.7 K/UL (ref 1.8–7.7)
NEUTROPHILS NFR BLD: 54 %
OSMOLALITY UR CALC.SUM OF ELEC: 288 MOSM/KG (ref 275–295)
PLATELET # BLD AUTO: 173 K/UL (ref 140–400)
PMV BLD AUTO: 9.5 FL (ref 7.4–10.3)
POTASSIUM SERPL-SCNC: 4.6 MMOL/L (ref 3.3–5.1)
PTH-INTACT SERPL-MCNC: 579.2 PG/ML (ref 12–88)
RBC # BLD AUTO: 3.65 M/UL (ref 3.7–5.4)
SODIUM SERPL-SCNC: 138 MMOL/L (ref 136–144)
WBC # BLD AUTO: 5 K/UL (ref 4–11)

## 2018-07-30 PROCEDURE — 85025 COMPLETE CBC W/AUTO DIFF WBC: CPT | Performed by: INTERNAL MEDICINE

## 2018-07-30 PROCEDURE — 80048 BASIC METABOLIC PNL TOTAL CA: CPT | Performed by: INTERNAL MEDICINE

## 2018-07-30 PROCEDURE — 90935 HEMODIALYSIS ONE EVALUATION: CPT

## 2018-07-30 PROCEDURE — 82962 GLUCOSE BLOOD TEST: CPT

## 2018-07-30 PROCEDURE — A4216 STERILE WATER/SALINE, 10 ML: HCPCS | Performed by: INTERNAL MEDICINE

## 2018-07-30 RX ORDER — HYDRALAZINE HYDROCHLORIDE 50 MG/1
50 TABLET, FILM COATED ORAL 3 TIMES DAILY
Status: DISCONTINUED | OUTPATIENT
Start: 2018-07-30 | End: 2018-08-01

## 2018-07-30 RX ORDER — ISOSORBIDE MONONITRATE 30 MG/1
30 TABLET, EXTENDED RELEASE ORAL DAILY
Qty: 30 TABLET | Refills: 0 | Status: SHIPPED | OUTPATIENT
Start: 2018-07-31 | End: 2018-08-01

## 2018-07-30 RX ORDER — AMLODIPINE BESYLATE 10 MG/1
10 TABLET ORAL DAILY
Qty: 30 TABLET | Refills: 0 | Status: ON HOLD | OUTPATIENT
Start: 2018-07-31 | End: 2019-03-01

## 2018-07-30 RX ORDER — HEPARIN SODIUM 5000 [USP'U]/ML
5000 INJECTION, SOLUTION INTRAVENOUS; SUBCUTANEOUS EVERY 12 HOURS SCHEDULED
Qty: 1 ML | Refills: 0 | Status: ON HOLD | OUTPATIENT
Start: 2018-07-30 | End: 2019-03-01

## 2018-07-30 NOTE — PROGRESS NOTES
Patient seen in follow up.  Patient denies any chest pain or sob.      07/30/18  0936   BP: 131/70   Pulse:    Resp:    Temp:        Intake/Output Summary (Last 24 hours) at 07/30/18 0944  Last data filed at 07/29/18 2344   Gross per 24 hour   Intake hydrALAzine HCl 25 MG Oral Tab Take 25 mg by mouth 3 (three) times daily. Sevelamer Carbonate 800 MG Oral Tab Take 800 mg by mouth 3 (three) times daily with meals. aspirin 81 MG Oral Tab Take 81 mg by mouth daily.    ferrous sulfate 325 (65 FE) MG Oral GLU  75  105*  82   BUN  24*  14  20   CREATSERUM  4.18*  2.95*  4.01*   GFRAA  11*  16*  11*   GFRNAA  9*  14*  10*   CA  9.3  9.6  9.6   NA  144  139  138   K  4.6  4.1  4.6   CL  105  104  103   CO2  29  27  28       Assessment and recommendation:    1

## 2018-07-30 NOTE — PLAN OF CARE
Problem: Patient Centered Care  Goal: Patient preferences are identified and integrated in the patient's plan of care  Interventions:  - What would you like us to know as we care for you? Per daughter, pt is typically very talkative and alert.   - Provide t and pain management  - Manage/alleviate anxiety  - Utilize distraction and/or relaxation techniques  - Monitor for opioid side effects  - Notify MD/LIP if interventions unsuccessful or patient reports new pain  - Anticipate increased pain with activity and signs and symptoms of volume excess or deficit  - Monitor intake, output and patient weight  - Monitor urine specific gravity, serum osmolarity and serum sodium as indicated or ordered  - Monitor response to interventions for patient's volume status, inclu

## 2018-07-30 NOTE — PAYOR COMM NOTE
--------------  ADMISSION REVIEW     Payor: [facility] [place] #:  [id number]  Authorization Number: [id number]    Admit date: 7/29/18  Admit time: 12       Admitting Physician: Yara Jerez MD  Attending Physician:  Rober Chery Patient is an 81-year-old female from [facility] who presents with chest pain started earlier this morning the pains have been intermittent all day and last 3-4 minutes and resolve on their own. Patient describes a sharp pain.   Denies any shortness of breat Ab: soft, nontender, no distension  Extremities: limited movement due to pain LUE. +left BKA.    Neuro: CN intact, slurred speech, no focal deficits  SKIN: warm, dry, no rashes        ED Course     Labs Reviewed   BASIC METABOLIC PANEL (8) - Abnormal; Notab Lung volumes are low, limiting sensitivity and specificity. Status post median sternotomy. Mild cardiomegaly and tortuous, atherosclerotic aorta. Mildly increased interstitial markings bilaterally, suggesting mild vascular congestion/fluid overload. DATE OF EXAM: 07/27/2018  Patient No:  [id number]  Physician:  Dayana NASSAR  YOB: 1935    Past Medical History (entered by Technologist):    Reason For Exam (entered by Technologist):  slurred speech  Other Notes (entered by Yanna Merry Clemente is a(n) 81year old female with past medical history of CAD status post CABG in 2011, severe peripheral vascular disease status post left AKA, DM 2, anemia of chronic disease and hypertension presented from nursing home complaining of chest pa Cholecalciferol (VITAMIN D3) 62996 units Oral Cap Take by mouth.   metoprolol tartrate 12.5 mg Oral Tab Take 12.5 mg by mouth 2x Daily(Beta Blocker). Senna 8.8 MG/5ML Oral Syrup Take 5 mL by mouth 2 (two) times daily.    glycerin, laxative, (GLYCERIN, ERROL –continue IV Lasix  –Continue dialysis and monitor    Suspected UTI  –Continue ceftriaxone  –Pending culture    ESRD–HD    DVT left internal jugular vein  Hypertension  CAD s/p  CABG in 2011   DM 2  Gout  Left AKA  PVD  Anemia of chronic disease  Morbid ob levOCARNitine (CARNITOR) 1 GM/10ML solution 330 mg     Date Action Dose Route User    7/29/2018 0910 Given 330 mg Oral Missy Haynes, RN      metoprolol Tartrate (LOPRESSOR) tab 12.5 mg     Date Action Dose Route User    7/30/2018 0457 Given 12.5 mg Oral S Last 3 Weights  07/28/18 0556 : 189 lb 12.8 oz (86.1 kg)  07/27/18 0410 : 188 lb 6.4 oz (85.5 kg)  07/26/18 2301 : 175 lb (79.4 kg)  05/19/18 0445 : 151 lb (68.5 kg)  11/30/16 2152 : 161 lb (73 kg)        Allergies:  No Known Allergies     Physical Exam: Anion Gap 0 - 18 mmol/L 10    Calculated Osmolality 275 - 295 mOsm/kg 301     GFR, Non- >=60 9     GFR, -American >=60 11       Troponin <=0.03 ng/mL 0.04        HDL Cholesterol mg/dL 44    Comments:    Cardiovascular Risk   Low: > o - receiving HD now also on lasix 40 mg IV daily will continue  - echo pending     3. Severe PVD  - s/p left AKA  - on aspirin, high dose statin     4.  Hypertension  - elevated  - on amlodipine 5 mg daily     Plan: increase amlodipine to 10 mg daily     Americo 11/30/16 : 161 lb (73 kg)        Exam  General appearance:  alert, appears stated age and cooperative  Pulmonary: clear to auscultation bilaterally  Cardiovascular: S1, S2 normal  Abdominal: soft, non-tender; bowel sounds normal; no masses,  no organomegal EF 55% in 2016  –Elevated BNP  –Continue dialysis and monitor     Suspected UTI  –Urine culture no growth  –Discontinue ceftriaxone     ESRD–HD     DVT left internal jugular vein  Hypertension  CAD s/p  CABG in 2011   DM 2  Gout  Left AKA  PVD  Anemia of c  07/29/2018   CREATSERUM 2.95 (H) 07/29/2018   BUN 14 07/29/2018    07/29/2018   K 4.1 07/29/2018    07/29/2018   CO2 27 07/29/2018    (H) 07/29/2018   CA 9.6 07/29/2018   ALB 3.8 11/30/2016   ALKPHO 295 (H) 11/30/2016   BILT 0.3

## 2018-07-30 NOTE — CM/SW NOTE
ADDENDUM 8:51AM    FRANCHESCA spoke with admissions from [facility] who stated that they are working on insurance auth.  FRANCHESCA spoke with pt's daughter Cecilio Mahajan who confirmed that she wants her mother admitted to [facility] upon discharge pending insurance au

## 2018-07-30 NOTE — PROGRESS NOTES
[address] Patient Status:  Inpatient    1935 MRN [id number]   Location [facility] 3W/SW Attending Lavelle Painting, [facility] Day # 1 PCP No primary care provider on file.      Caitlyn He is a 81 year o Ultrafiltration today   CAD   CHF  ANEMIA.

## 2018-07-31 LAB
GLUCOSE BLDC GLUCOMTR-MCNC: 109 MG/DL (ref 70–99)
GLUCOSE BLDC GLUCOMTR-MCNC: 124 MG/DL (ref 70–99)
GLUCOSE BLDC GLUCOMTR-MCNC: 75 MG/DL (ref 70–99)
GLUCOSE BLDC GLUCOMTR-MCNC: 85 MG/DL (ref 70–99)

## 2018-07-31 PROCEDURE — A4216 STERILE WATER/SALINE, 10 ML: HCPCS | Performed by: INTERNAL MEDICINE

## 2018-07-31 PROCEDURE — 82962 GLUCOSE BLOOD TEST: CPT

## 2018-07-31 NOTE — PLAN OF CARE
Diabetes/Glucose Control    • Glucose maintained within prescribed range Progressing        Impaired Activities of Daily Living    • Achieve highest/safest level of independence in self care Progressing        Impaired Cognition    • Patient will exhibit i

## 2018-07-31 NOTE — CM/SW NOTE
ADDENDUM 3:36PM     FRANCHESCA spoke with Maki Liliagarett from [facility] [phone] who stated that they have insurance auth and are able to accep pt when she is medically cleared for discharge. FRANCHESCA left a message for pt's dtr Brenna.  FRANCHESCA informed Jennifer Matos

## 2018-07-31 NOTE — PLAN OF CARE
Problem: Patient Centered Care  Goal: Patient preferences are identified and integrated in the patient's plan of care  Interventions:  - What would you like us to know as we care for you? Per daughter, pt is typically very talkative and alert.   - Provide t interventions unsuccessful or patient reports new pain  - Anticipate increased pain with activity and pre-medicate as appropriate   Outcome: Adequate for Discharge      Problem: SAFETY ADULT - FALL  Goal: Free from fall injury  INTERVENTIONS:  - Assess pt range  INTERVENTIONS:  - Monitor Blood Glucose as ordered  - Assess for signs and symptoms of hyperglycemia and hypoglycemia  - Administer ordered medications to maintain glucose within target range  - Assess barriers to adequate nutritional intake and ini

## 2018-07-31 NOTE — PROGRESS NOTES
07/31/18 1631   Clinical Encounter Type   Visited With Patient   Routine Visit Introduction   Continue Visiting Yes   Patient's Supportive Strategies/Resources crissy and family   [place] Encounters   [place] Needs Prayer   Patient Spiritual Encounte

## 2018-07-31 NOTE — PROGRESS NOTES
Patient seen in follow up.  Patient denies any chest pain but has sob.   07/31/18  0809   BP: 157/54   Pulse: 66   Resp: 20   Temp: 98.4 °F (36.9 °C)       Intake/Output Summary (Last 24 hours) at 07/31/18 1149  Last data filed at 07/31/18 0917   Queenie Saavedra hydrALAzine HCl 25 MG Oral Tab Take 25 mg by mouth 3 (three) times daily. Sevelamer Carbonate 800 MG Oral Tab Take 800 mg by mouth 3 (three) times daily with meals. aspirin 81 MG Oral Tab Take 81 mg by mouth daily.    ferrous sulfate 325 (65 FE) MG Oral Added nitrates. Will not titrate further as bp is labile and tolerability at higher dose is lower with headache. In future can titrate to imdur 60/d. LIZ HILARIO.     [address], [facility]  [address], [facility]  Phone: [phone]

## 2018-08-01 VITALS
RESPIRATION RATE: 18 BRPM | HEART RATE: 70 BPM | WEIGHT: 184.19 LBS | BODY MASS INDEX: 30.69 KG/M2 | DIASTOLIC BLOOD PRESSURE: 70 MMHG | SYSTOLIC BLOOD PRESSURE: 142 MMHG | OXYGEN SATURATION: 97 % | HEIGHT: 65 IN | TEMPERATURE: 98 F

## 2018-08-01 LAB — GLUCOSE BLDC GLUCOMTR-MCNC: 84 MG/DL (ref 70–99)

## 2018-08-01 PROCEDURE — A4216 STERILE WATER/SALINE, 10 ML: HCPCS | Performed by: INTERNAL MEDICINE

## 2018-08-01 PROCEDURE — 90935 HEMODIALYSIS ONE EVALUATION: CPT

## 2018-08-01 PROCEDURE — 82962 GLUCOSE BLOOD TEST: CPT

## 2018-08-01 RX ORDER — ISOSORBIDE MONONITRATE 30 MG/1
30 TABLET, EXTENDED RELEASE ORAL DAILY
Qty: 30 TABLET | Refills: 0 | Status: ON HOLD | OUTPATIENT
Start: 2018-08-01 | End: 2019-03-01

## 2018-08-01 RX ORDER — HEPARIN SODIUM (PORCINE) LOCK FLUSH IV SOLN 100 UNIT/ML 100 UNIT/ML
SOLUTION INTRAVENOUS
Status: COMPLETED
Start: 2018-08-01 | End: 2018-08-01

## 2018-08-01 RX ORDER — ATORVASTATIN CALCIUM 80 MG/1
80 TABLET, FILM COATED ORAL NIGHTLY
Qty: 30 TABLET | Refills: 0 | Status: SHIPPED | OUTPATIENT
Start: 2018-08-01

## 2018-08-01 RX ORDER — ASPIRIN 81 MG/1
81 TABLET, CHEWABLE ORAL DAILY
Qty: 30 TABLET | Refills: 0 | Status: SHIPPED | OUTPATIENT
Start: 2018-08-02

## 2018-08-01 RX ORDER — HYDRALAZINE HYDROCHLORIDE 50 MG/1
50 TABLET, FILM COATED ORAL 3 TIMES DAILY
Qty: 90 TABLET | Refills: 0 | Status: ON HOLD | OUTPATIENT
Start: 2018-08-01 | End: 2019-03-29

## 2018-08-01 RX ORDER — SODIUM CHLORIDE 9 MG/ML
INJECTION, SOLUTION INTRAVENOUS
Status: COMPLETED
Start: 2018-08-01 | End: 2018-08-01

## 2018-08-01 NOTE — PROGRESS NOTES
[place] [facility]    Progress Note    Mariajose Ford Patient Status:  Observation    1935 MRN [id number]   Location [facility] 3W/SW Attending Emiliano Trevino, [facility] Day # 3 PCP No primary care provider on file.        SUBJ 5,000 Units Subcutaneous 2 times per day   levOCARNitine (CARNITOR) 1 GM/10ML solution 330 mg 330 mg Oral Daily   Sodium Chloride 0.9 % solution 3 mL 3 mL Intravenous PRN   aspirin chewable tab 81 mg 81 mg Oral Daily   atorvastatin (LIPITOR) tab 80 mg 80 m

## 2018-08-01 NOTE — CM/SW NOTE
FRANCHESCA informed by RN that pt is medically stable for discharge today at 3:00pm. FRANCHESCA spoke with [facility]   from [facility]  who confirmed that they can accept pt into room 121A at this time. FRANCHESCA ordered an ambulance from [facility] [phone] .  Tin Green

## 2018-08-01 NOTE — PROGRESS NOTES
[address] Patient Status:  Inpatient    1935 MRN [id number]   Location [facility] 3W/SW Attending Clotilde Noriega, *   Hosp Day # 2 PCP No primary care provider on file.      Danisha Benitez is a 81 year o

## 2018-08-01 NOTE — PROGRESS NOTES
[place] [facility] - [facility]    Progress Note    Danisha Benitez Patient Status:  Observation    1935 MRN [id number]   Location [facility] 3W/SW Attending Clotilde Noriega, *   Hosp Day # 2 PCP No primary care provider on file.        SUBJ Q12H PRN   Heparin Sodium (Porcine) 5000 UNIT/ML injection 5,000 Units 5,000 Units Subcutaneous 2 times per day   levOCARNitine (CARNITOR) 1 GM/10ML solution 330 mg 330 mg Oral Daily   Sodium Chloride 0.9 % solution 3 mL 3 mL Intravenous PRN   aspirin chew

## 2018-08-01 NOTE — PROGRESS NOTES
[facility]  Progress Note    Peola Rho Patient Status:  Inpatient    1935 MRN [id number]   Location [facility] 3W/SW Attending Vela Kayser, [facility] Day # 3 PCP No primary care provider on file.      Subjective:  Patient Cardiac: Regular rate and rhythm, 1/6 murmur RUSB  Lungs: Clear without wheezes, rales, rhonchi or dullness. Normal excursions and effort. Abdomen: Soft, non-tender. Extremities: +1 pitting edema BLE.   Peripheral pulses +2 bilateral radial sites, not f High: < or = 40 mg/dL   Cholesterol, Total 110 - 200 mg/dL 132    Triglycerides 1 - 149 mg/dL 170     Comments:      Reference Range (NCEP)   Normal              < 150   Borderline High     150-199   High                200-499   Very High           >/= 50 - started on isosorbide now daughter reports patient had migraines in the past on isosorbide    2. Acute on chronic diastolic CHF  - dialysis for volume management    3.  HTN  - poorly controlled but also labile  - on amlodipine 10 mg daily, hydralazine 50

## 2018-08-02 NOTE — PAYOR COMM NOTE
--------------  DISCHARGE REVIEW    Payor: [facility] Dominic McCaulley #:  [id number]  Authorization Number: [id number]    Admit date: 7/29/18  Admit time:  2791  Discharge Date: 8/1/2018  4:05 PM     Admitting Physician: Donnie Faustin 08/01/18 0339 : 184 lb 3.2 oz (83.6 kg)  07/31/18 0355 : 183 lb 3.2 oz (83.1 kg)  07/30/18 0411 : 185 lb 14.4 oz (84.3 kg)  07/28/18 0556 : 189 lb 12.8 oz (86.1 kg)  07/27/18 0410 : 188 lb 6.4 oz (85.5 kg)  07/26/18 2301 : 175 lb (79.4 kg)  05/19/18 0445 : in ejection fraction and wall motion abnormalities are no longer  Seen.     7/27/18  Sinus Rhythm -First degree A-V block  - Negative T-waves - Lateral ischemia.   ABNORMAL     Labs:     Glucose 70 - 99 mg/dL 82    Sodium 136 - 144 mmol/L 138    Potassium 3 levOCARNitine (CARNITOR) 1 GM/10ML solution 330 mg 330 mg Oral Daily   Sodium Chloride 0.9 % solution 3 mL 3 mL Intravenous PRN   aspirin chewable tab 81 mg 81 mg Oral Daily   atorvastatin (LIPITOR) tab 80 mg 80 mg Oral Nightly   hydrALAzine HCl (APRESOLIN

## 2018-08-18 ENCOUNTER — APPOINTMENT (OUTPATIENT)
Dept: GENERAL RADIOLOGY | Facility: HOSPITAL | Age: 83
End: 2018-08-18
Attending: EMERGENCY MEDICINE
Payer: MEDICARE

## 2018-08-18 ENCOUNTER — APPOINTMENT (OUTPATIENT)
Dept: CT IMAGING | Facility: HOSPITAL | Age: 83
End: 2018-08-18
Attending: EMERGENCY MEDICINE
Payer: MEDICARE

## 2018-08-18 ENCOUNTER — HOSPITAL ENCOUNTER (EMERGENCY)
Facility: HOSPITAL | Age: 83
Discharge: HOME | End: 2018-08-19
Attending: EMERGENCY MEDICINE
Payer: MEDICARE

## 2018-08-18 VITALS
OXYGEN SATURATION: 98 % | BODY MASS INDEX: 31.22 KG/M2 | HEART RATE: 76 BPM | RESPIRATION RATE: 14 BRPM | SYSTOLIC BLOOD PRESSURE: 188 MMHG | WEIGHT: 187.38 LBS | DIASTOLIC BLOOD PRESSURE: 78 MMHG | HEIGHT: 65 IN | TEMPERATURE: 99 F
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
## 2018-08-18 DIAGNOSIS — S09.90XA INJURY OF HEAD, INITIAL ENCOUNTER: Primary | ICD-10-CM

## 2018-08-18 DIAGNOSIS — S46.811A TRAPEZIUS STRAIN, RIGHT, INITIAL ENCOUNTER: ICD-10-CM

## 2018-08-18 DIAGNOSIS — S80.01XA CONTUSION OF RIGHT KNEE, INITIAL ENCOUNTER: ICD-10-CM

## 2018-08-18 PROCEDURE — 70450 CT HEAD/BRAIN W/O DYE: CPT | Performed by: EMERGENCY MEDICINE

## 2018-08-18 PROCEDURE — 72125 CT NECK SPINE W/O DYE: CPT | Performed by: EMERGENCY MEDICINE

## 2018-08-18 PROCEDURE — 73560 X-RAY EXAM OF KNEE 1 OR 2: CPT | Performed by: EMERGENCY MEDICINE

## 2018-08-18 PROCEDURE — 99284 EMERGENCY DEPT VISIT MOD MDM: CPT

## 2018-08-18 RX ORDER — HYDROCODONE BITARTRATE AND ACETAMINOPHEN 5; 325 MG/1; MG/1
1 TABLET ORAL ONCE
Status: COMPLETED | OUTPATIENT
Start: 2018-08-18 | End: 2018-08-18

## 2018-08-18 RX ORDER — HYDROCODONE BITARTRATE AND ACETAMINOPHEN 5; 325 MG/1; MG/1
1 TABLET ORAL EVERY 6 HOURS PRN
Qty: 12 TABLET | Refills: 0 | Status: ON HOLD | OUTPATIENT
Start: 2018-08-18 | End: 2019-03-29

## 2018-08-19 NOTE — ED PROVIDER NOTES
Patient Seen in: [facility] Emergency Department    History   Patient presents with:  Fall (musculoskeletal, neurologic)      HPI    Patient presents to the ED after falling in her nursing home early this morning.   Patient states that she rolled o person, place, and time. She appears well-developed. No distress. Obese   HENT:   Head: Normocephalic and atraumatic. Eyes: Conjunctivae are normal. Right eye exhibits no discharge. Left eye exhibits no discharge. Neck: No tracheal deviation present. and is intended solely for the use of the individual or entity to which it is addressed.  If you are not the intended recipient of this report, you are hereby notified that any copying, distribution, dissemination or action taken in relation to the contents 2202)         [facility]      08/18/18 2044 08/18/18 2045 08/18/18 2215   BP: (!) 169/80 (!) 169/80 (!) 188/78   Pulse: 75 76 76   Resp: 14 16 14   Temp: 98.5 °F (36.9 °C)     TempSrc: Temporal     SpO2: 97% 98% 98%   Weight: 85 kg     Height: 165.1 cm (5' 5\" 5-325 MG Oral Tab  Take 1 tablet by mouth every 6 (six) hours as needed for Pain., Print Script, Disp-12 tablet, R-0    !! - Potential duplicate medications found. Please discuss with provider.

## 2018-08-19 NOTE — ED NOTES
Pt arrives via EMS after unwitnessed fall at 0500 today. Pts daughter states pt rolled out of bed and was under the bed. Pt later c/o neck pain, right hand pain and right knee pain to daughter. Pt is AOx3, skin is warm and dry, respirations non labored.

## 2018-08-19 NOTE — ED INITIAL ASSESSMENT (HPI)
Unwitnessed fall at nursing home, pt was on floor at 0500 today, possible loc    Pt began to c/o neck pain, right hand pain, right knee pain to daughter this afternoon

## 2018-08-29 NOTE — DISCHARGE SUMMARY
[place] [place] [facility]    Discharge Summary    Mariajose Ford Patient Status:  Inpatient    1935 MRN [id number]   Location [facility] 3W/SW Attending No att. providers found   Hosp Day # 3 PCP Taylor Rosales III     Date of Admission:  2016  –Elevated BNP  –Continue dialysis and monitor     Suspected UTI  –Urine culture no growth  –Discontinue ceftriaxone     ESRD–HD     DVT left internal jugular vein  Hypertension  CAD s/p  CABG in 2011   DM 2  Gout  Left AKA  PVD  Anemia of chronic dis daily.   Quantity:  90 tablet  Refills:  0     metoprolol Tartrate 25 MG Tabs  Commonly known as:  LOPRESSOR  What changed:  medication strength      Take 0.5 tablets (12.5 mg total) by mouth 2x Daily(Beta Blocker).    Quantity:  60 tablet  Refills:  0 0     TraMADol HCl 50 MG Tabs  Commonly known as:  ULTRAM      Take 50 mg by mouth every 6 (six) hours as needed for Pain. Refills:  0     Vitamin D3 99923 units Caps      Take 50,000 Units by mouth once a week.    Refills:  0        STOP taking these med

## 2018-09-05 ENCOUNTER — HOSPITAL ENCOUNTER (EMERGENCY)
Facility: HOSPITAL | Age: 83
Discharge: NURSING, REHAB OR LONG-TERM CARE | End: 2018-09-05
Attending: EMERGENCY MEDICINE
Payer: MEDICARE

## 2018-09-05 ENCOUNTER — APPOINTMENT (OUTPATIENT)
Dept: GENERAL RADIOLOGY | Facility: HOSPITAL | Age: 83
End: 2018-09-05
Attending: EMERGENCY MEDICINE
Payer: MEDICARE

## 2018-09-05 VITALS
DIASTOLIC BLOOD PRESSURE: 67 MMHG | RESPIRATION RATE: 18 BRPM | SYSTOLIC BLOOD PRESSURE: 177 MMHG | OXYGEN SATURATION: 96 % | TEMPERATURE: 98 F | HEART RATE: 68 BPM

## 2018-09-05 DIAGNOSIS — R40.4 TRANSIENT ALTERATION OF AWARENESS: Primary | ICD-10-CM

## 2018-09-05 LAB
ANION GAP SERPL CALC-SCNC: 11 MMOL/L (ref 0–18)
BASOPHILS # BLD: 0 K/UL (ref 0–0.2)
BASOPHILS NFR BLD: 0 %
BUN SERPL-MCNC: 17 MG/DL (ref 8–20)
BUN/CREAT SERPL: 5 (ref 10–20)
CALCIUM SERPL-MCNC: 8.9 MG/DL (ref 8.5–10.5)
CHLORIDE SERPL-SCNC: 96 MMOL/L (ref 95–110)
CO2 SERPL-SCNC: 30 MMOL/L (ref 22–32)
CREAT SERPL-MCNC: 3.42 MG/DL (ref 0.5–1.5)
EOSINOPHIL # BLD: 0.2 K/UL (ref 0–0.7)
EOSINOPHIL NFR BLD: 2 %
ERYTHROCYTE [DISTWIDTH] IN BLOOD BY AUTOMATED COUNT: 17.3 % (ref 11–15)
GLUCOSE SERPL-MCNC: 126 MG/DL (ref 70–99)
HCT VFR BLD AUTO: 41.2 % (ref 35–48)
HGB BLD-MCNC: 13 G/DL (ref 12–16)
LYMPHOCYTES # BLD: 1.2 K/UL (ref 1–4)
LYMPHOCYTES NFR BLD: 12 %
MCH RBC QN AUTO: 28 PG (ref 27–32)
MCHC RBC AUTO-ENTMCNC: 31.5 G/DL (ref 32–37)
MCV RBC AUTO: 88.9 FL (ref 80–100)
MONOCYTES # BLD: 0.7 K/UL (ref 0–1)
MONOCYTES NFR BLD: 7 %
NEUTROPHILS # BLD AUTO: 7.8 K/UL (ref 1.8–7.7)
NEUTROPHILS NFR BLD: 79 %
OSMOLALITY UR CALC.SUM OF ELEC: 287 MOSM/KG (ref 275–295)
PLATELET # BLD AUTO: 153 K/UL (ref 140–400)
PMV BLD AUTO: 10.2 FL (ref 7.4–10.3)
POTASSIUM SERPL-SCNC: 3 MMOL/L (ref 3.3–5.1)
RBC # BLD AUTO: 4.64 M/UL (ref 3.7–5.4)
SODIUM SERPL-SCNC: 137 MMOL/L (ref 136–144)
WBC # BLD AUTO: 9.9 K/UL (ref 4–11)

## 2018-09-05 PROCEDURE — 73630 X-RAY EXAM OF FOOT: CPT | Performed by: EMERGENCY MEDICINE

## 2018-09-05 PROCEDURE — 80048 BASIC METABOLIC PNL TOTAL CA: CPT | Performed by: EMERGENCY MEDICINE

## 2018-09-05 PROCEDURE — 96374 THER/PROPH/DIAG INJ IV PUSH: CPT

## 2018-09-05 PROCEDURE — 85025 COMPLETE CBC W/AUTO DIFF WBC: CPT | Performed by: EMERGENCY MEDICINE

## 2018-09-05 PROCEDURE — 99285 EMERGENCY DEPT VISIT HI MDM: CPT

## 2018-09-05 PROCEDURE — 87086 URINE CULTURE/COLONY COUNT: CPT | Performed by: EMERGENCY MEDICINE

## 2018-09-05 RX ORDER — HYDROCODONE BITARTRATE AND ACETAMINOPHEN 5; 325 MG/1; MG/1
1-2 TABLET ORAL EVERY 4 HOURS PRN
Qty: 10 TABLET | Refills: 0 | Status: SHIPPED | OUTPATIENT
Start: 2018-09-05 | End: 2018-09-05

## 2018-09-05 RX ORDER — MORPHINE SULFATE 4 MG/ML
4 INJECTION, SOLUTION INTRAMUSCULAR; INTRAVENOUS ONCE
Status: COMPLETED | OUTPATIENT
Start: 2018-09-05 | End: 2018-09-05

## 2018-09-05 NOTE — ED INITIAL ASSESSMENT (HPI)
Per ems pt was brought in for being unresponsive at dialysis. Pt states that she fell asleep at time bc she took a norco. Pt states that she has pain but no other symptoms to reports. Ems states that she has been awake and alert en route.

## 2018-09-05 NOTE — ED NOTES
Pt presents after being at dialysis. Pain noted to bottom and left arm. Family states pt has been thru \" a lot. \" Fractures, falls, etc. Pt has a left BKA. On assessment, pt with defecation, cleaned and repositioned high in bed.  Stage 1 ulcer noted to rig

## 2018-09-05 NOTE — ED PROVIDER NOTES
Patient Seen in: [facility] AND [facility] Emergency Department    History   Patient presents with:  Pain (neurologic)    Stated Complaint:     HPI    81-year-old female with past medical history significant for CHF, diabetes, high blood pressure, end-stage louie clear b/l  Nose: Nose normal.   Mouth/Throat: MMM, post OP clear with no exudates  Eyes: Conjunctivae and EOM are normal. PERRLA  Neck: Normal range of motion. Neck supple. No tracheal deviation present.    CV: s1s2+, RRR, no m/r/g, normal distal pulses  Pu RIGHT (CPT=73630)     COMPARISON: None.     INDICATIONS: General right foot pain x1 month, no known trauma.     TECHNIQUE: 3 views were obtained.       FINDINGS:   BONES: No acute fracture. Hammertoe deformities second through fifth toe.  Patchy bony demine diagnosis)    Disposition:  Discharge  9/5/2018  5:17 pm    Follow-up:  Braulio Christensen [address] [place] [address]  [phone]    In 2 days  As needed    We recommend that you schedule follow up care with a primary care provi

## 2018-09-06 NOTE — ED NOTES
Pt now declines pain, MD Hernández Oar w/o further orders. No concern. Pt discharged with family and [facility].

## 2018-10-07 ENCOUNTER — HOSPITAL ENCOUNTER (EMERGENCY)
Facility: HOSPITAL | Age: 83
Discharge: HOME | End: 2018-10-07
Attending: EMERGENCY MEDICINE
Payer: MEDICARE

## 2018-10-07 VITALS
HEART RATE: 66 BPM | OXYGEN SATURATION: 93 % | WEIGHT: 187.38 LBS | DIASTOLIC BLOOD PRESSURE: 79 MMHG | SYSTOLIC BLOOD PRESSURE: 164 MMHG | RESPIRATION RATE: 16 BRPM | BODY MASS INDEX: 31 KG/M2

## 2018-10-07 DIAGNOSIS — I10 UNCONTROLLED HYPERTENSION: ICD-10-CM

## 2018-10-07 DIAGNOSIS — R11.2 NAUSEA AND VOMITING, INTRACTABILITY OF VOMITING NOT SPECIFIED, UNSPECIFIED VOMITING TYPE: Primary | ICD-10-CM

## 2018-10-07 PROCEDURE — 99285 EMERGENCY DEPT VISIT HI MDM: CPT

## 2018-10-07 PROCEDURE — 36415 COLL VENOUS BLD VENIPUNCTURE: CPT

## 2018-10-07 PROCEDURE — 80048 BASIC METABOLIC PNL TOTAL CA: CPT | Performed by: EMERGENCY MEDICINE

## 2018-10-07 PROCEDURE — 85025 COMPLETE CBC W/AUTO DIFF WBC: CPT | Performed by: EMERGENCY MEDICINE

## 2018-10-07 RX ORDER — HYDRALAZINE HYDROCHLORIDE 50 MG/1
50 TABLET, FILM COATED ORAL ONCE
Status: COMPLETED | OUTPATIENT
Start: 2018-10-07 | End: 2018-10-07

## 2018-10-07 RX ORDER — ONDANSETRON 4 MG/1
4 TABLET, FILM COATED ORAL ONCE
Status: COMPLETED | OUTPATIENT
Start: 2018-10-07 | End: 2018-10-07

## 2018-10-07 RX ORDER — HYDROCODONE BITARTRATE AND ACETAMINOPHEN 5; 325 MG/1; MG/1
1 TABLET ORAL ONCE
Status: COMPLETED | OUTPATIENT
Start: 2018-10-07 | End: 2018-10-07

## 2018-10-07 NOTE — ED INITIAL ASSESSMENT (HPI)
Vomited x2 at [facility], missed dialysis x2 at NH this week, states she is a little sob .

## 2018-10-08 NOTE — ED NOTES
[facility] ETA 30-40 min  Ok per DR MACHUCA [facility] for pt to be discharged with current BP. Hydralazine was given.

## 2018-10-08 NOTE — ED PROVIDER NOTES
Patient Seen in: [facility] AND [facility] Emergency Department    History   Patient presents with:  Nausea/Vomiting/Diarrhea (gastrointestinal)      HPI    Patient presents to the ED after vomiting twice at her care facility after being given Norco today.   Daug and vital signs reviewed. Social History and Family History elements reviewed from today, pertinent positives to the presenting problem noted.     Physical Exam     ED Triage Vitals [10/07/18 1758]   BP (!) 175/60   Pulse 67   Resp 16   Temp    Temp sr Please view results for these tests on the individual orders.    RAINBOW DRAW BLUE   RAINBOW DRAW LAVENDER   RAINBOW DRAW DARK GREEN   RAINBOW DRAW LIGHT GREEN   RAINBOW DRAW GOLD   RAINBOW DRAW LAVENDER TALL (BNP)         Imaging Results Available and time.  Patient scheduled for dialysis in less than 7 hours and I feel stable for discharge back to her care facility at this time due to this. She will return to the ED if worse, PMD follow-up.     The patient was given the following instructions,  \"You h

## 2018-11-13 ENCOUNTER — HOSPITAL ENCOUNTER (EMERGENCY)
Facility: HOSPITAL | Age: 83
Discharge: HOME | End: 2018-11-13
Attending: EMERGENCY MEDICINE
Payer: MEDICARE

## 2018-11-13 VITALS
TEMPERATURE: 98 F | RESPIRATION RATE: 20 BRPM | DIASTOLIC BLOOD PRESSURE: 78 MMHG | HEIGHT: 65 IN | OXYGEN SATURATION: 99 % | WEIGHT: 156 LBS | SYSTOLIC BLOOD PRESSURE: 152 MMHG | BODY MASS INDEX: 25.99 KG/M2 | HEART RATE: 75 BPM

## 2018-11-13 DIAGNOSIS — N39.0 URINARY TRACT INFECTION WITH HEMATURIA, SITE UNSPECIFIED: Primary | ICD-10-CM

## 2018-11-13 DIAGNOSIS — R31.9 URINARY TRACT INFECTION WITH HEMATURIA, SITE UNSPECIFIED: Primary | ICD-10-CM

## 2018-11-13 PROCEDURE — 99285 EMERGENCY DEPT VISIT HI MDM: CPT

## 2018-11-13 PROCEDURE — 96372 THER/PROPH/DIAG INJ SC/IM: CPT

## 2018-11-13 RX ORDER — CEPHALEXIN 500 MG/1
500 CAPSULE ORAL 2 TIMES DAILY
Qty: 14 CAPSULE | Refills: 0 | Status: SHIPPED | OUTPATIENT
Start: 2018-11-13 | End: 2018-11-20

## 2018-11-13 NOTE — ED NOTES
Pt straight cath per this RN using sterile technique  Output < 5 ml of thick, bloody urine. MD called to bs  Bladder scan completed, showed 0 ml of urine.   Sample sent down for culture only  Pt and daughter updated

## 2018-11-13 NOTE — ED NOTES
Received pt a/ox3, clear speech, nad, no resp distress, bed ridden  Here with c/o dysuria x 3 days. Reports NH tried to straight cath but was unsuccessful  Denies any other complaints at this time    Upon assessment, medium green, soft stool noted.  Sample

## 2018-11-14 NOTE — ED NOTES
2nd soiled diaper changed, basilio care endorsed  Care endorsed to [facility] contacted for transport back, eta 30 mins

## 2018-11-14 NOTE — ED NOTES
Pt c/o pain to buttock requesting norco  [facility] at bs  Called NH, spoke with Stephanie Parents and he states he will have it ready for her upon arrival    Pt stable upon leaving ED with [facility]s

## 2018-11-15 NOTE — ED PROVIDER NOTES
Patient Seen in: [facility] AND [facility] Emergency Department    History   Patient presents with:  Urinary Symptoms (urologic)    Stated Complaint: dysuria    HPI    80 yo female presents for evaluation of dysuria.  Pt reports pain with urination for the past 2 are normal. She exhibits no distension. There is no tenderness. There is no rebound and no guarding. Musculoskeletal: Normal range of motion. Neurological: She is alert and oriented to person, place, and time.    No focal deficit   Skin: Skin is warm an

## 2018-11-16 ENCOUNTER — HOSPITAL ENCOUNTER (EMERGENCY)
Facility: HOSPITAL | Age: 83
Discharge: HOME | End: 2018-11-16
Attending: EMERGENCY MEDICINE
Payer: MEDICARE

## 2018-11-16 ENCOUNTER — APPOINTMENT (OUTPATIENT)
Dept: CT IMAGING | Facility: HOSPITAL | Age: 83
End: 2018-11-16
Attending: EMERGENCY MEDICINE
Payer: MEDICARE

## 2018-11-16 VITALS
DIASTOLIC BLOOD PRESSURE: 84 MMHG | HEART RATE: 54 BPM | SYSTOLIC BLOOD PRESSURE: 177 MMHG | OXYGEN SATURATION: 100 % | RESPIRATION RATE: 15 BRPM | TEMPERATURE: 98 F

## 2018-11-16 DIAGNOSIS — I10 ESSENTIAL HYPERTENSION: Primary | ICD-10-CM

## 2018-11-16 PROCEDURE — 36415 COLL VENOUS BLD VENIPUNCTURE: CPT

## 2018-11-16 PROCEDURE — 84484 ASSAY OF TROPONIN QUANT: CPT | Performed by: EMERGENCY MEDICINE

## 2018-11-16 PROCEDURE — 93010 ELECTROCARDIOGRAM REPORT: CPT | Performed by: EMERGENCY MEDICINE

## 2018-11-16 PROCEDURE — 93005 ELECTROCARDIOGRAM TRACING: CPT

## 2018-11-16 PROCEDURE — 80048 BASIC METABOLIC PNL TOTAL CA: CPT | Performed by: EMERGENCY MEDICINE

## 2018-11-16 PROCEDURE — 80061 LIPID PANEL: CPT | Performed by: EMERGENCY MEDICINE

## 2018-11-16 PROCEDURE — 85025 COMPLETE CBC W/AUTO DIFF WBC: CPT | Performed by: EMERGENCY MEDICINE

## 2018-11-16 PROCEDURE — 70450 CT HEAD/BRAIN W/O DYE: CPT | Performed by: EMERGENCY MEDICINE

## 2018-11-16 PROCEDURE — 99285 EMERGENCY DEPT VISIT HI MDM: CPT

## 2018-11-16 RX ORDER — AMLODIPINE BESYLATE 10 MG/1
10 TABLET ORAL DAILY
Status: DISCONTINUED | OUTPATIENT
Start: 2018-11-16 | End: 2018-11-16

## 2018-11-16 RX ORDER — AMLODIPINE BESYLATE 10 MG/1
10 TABLET ORAL ONCE
Status: DISCONTINUED | OUTPATIENT
Start: 2018-11-16 | End: 2018-11-16

## 2018-11-16 NOTE — ED PROVIDER NOTES
Patient Seen in: [facility] AND [facility] Emergency Department    History   Patient presents with:  Hypertension (cardiovascular)    Stated Complaint: htn    HPI    Patient is an 60-year-old female who arrives by EMS from her dialysis for elevated blood pressur Creatinine 1.62 (*)     BUN/CREA Ratio 4.3 (*)     GFR, Non- 29 (*)     GFR, -American 34 (*)     All other components within normal limits   TROPONIN I - Abnormal; Notable for the following components:    Troponin 0.05 (*)     A volume loss with sequela of chronic microangiopathy and large vessel atherosclerosis. 4. Partially imaged postsurgical changes of right functional endoscopic sinus surgery. 5. Lesser incidental findings as above.   Dictated by (CST): Janett Landa MD on 1

## 2018-11-16 NOTE — ED INITIAL ASSESSMENT (HPI)
Pt arrived via medics to rm 47 for complaint of htn.   States she just finished dialysis and [facility] was there to  and bring home when they noted high blood pressure with systolic well over 571 with more than one reading, pt has no complaints

## 2018-11-17 NOTE — ED NOTES
Pt and pts family member provided with discharge instructions. Verbalized understanding for plan of care at home and follow up. All questions/concerns addressed prior to discharge. Iv removed by er tech. Care endorsed to [facility].

## 2018-11-17 NOTE — ED PROVIDER NOTES
Signout taken with dispo pending 2 hour troponin in setting of chronically/minimally elevated troponin. 2 hr downtrending, will DC to NH as previously anticipated.     Recent Results (from the past 24 hour(s))   BASIC METABOLIC PANEL (8)    Collection Time:

## 2019-01-15 ENCOUNTER — HOSPITAL ENCOUNTER (EMERGENCY)
Facility: HOSPITAL | Age: 84
Discharge: HOME | End: 2019-01-15
Attending: EMERGENCY MEDICINE
Payer: MEDICARE

## 2019-01-15 VITALS
TEMPERATURE: 98 F | WEIGHT: 160 LBS | HEIGHT: 64 IN | SYSTOLIC BLOOD PRESSURE: 151 MMHG | RESPIRATION RATE: 18 BRPM | HEART RATE: 54 BPM | DIASTOLIC BLOOD PRESSURE: 46 MMHG | BODY MASS INDEX: 27.31 KG/M2 | OXYGEN SATURATION: 100 %

## 2019-01-15 DIAGNOSIS — K61.1 ABSCESS, PERIRECTAL: Primary | ICD-10-CM

## 2019-01-15 LAB — GLUCOSE BLDC GLUCOMTR-MCNC: 106 MG/DL (ref 70–99)

## 2019-01-15 PROCEDURE — 99284 EMERGENCY DEPT VISIT MOD MDM: CPT

## 2019-01-15 PROCEDURE — 82962 GLUCOSE BLOOD TEST: CPT

## 2019-01-15 PROCEDURE — 46050 I&D PERIANAL ABSCESS SUPFC: CPT

## 2019-01-15 RX ORDER — DOXYCYCLINE HYCLATE 100 MG/1
100 CAPSULE ORAL ONCE
Status: COMPLETED | OUTPATIENT
Start: 2019-01-15 | End: 2019-01-15

## 2019-01-15 RX ORDER — DOXYCYCLINE HYCLATE 100 MG/1
100 CAPSULE ORAL 2 TIMES DAILY
Qty: 20 CAPSULE | Refills: 0 | Status: SHIPPED | OUTPATIENT
Start: 2019-01-15 | End: 2019-01-25

## 2019-01-16 NOTE — ED PROVIDER NOTES
Patient Seen in: [facility] Emergency Department    History   No chief complaint on file. Stated Complaint: anal protrusion     HPI    80 yo F with PMH DM, HTN, HL, ESRD presenting for evaluation of basilio-rectal swelling as noted by staff.  Patient Tab,  Take 100 mg by mouth daily. HYDROcodone-acetaminophen 5-325 MG Oral Tab,  Take 1 tablet by mouth every 4 (four) hours as needed for Pain.    TraMADol HCl 50 MG Oral Tab,  Take 50 mg by mouth every 6 (six) hours as needed for Pain.   levOCARNitine 1 (Oral)   Resp 18   Ht 162.6 cm (5' 4\")   Wt 72.6 kg   SpO2 99%   BMI 27.46 kg/m²         Physical Exam   Constitutional: No distress. Nontoxic. HEENT: MMM. Head: Normocephalic. Eyes: No injection. Pulmonary/Chest: Effort normal.   Abdominal: Soft.  Claryce Forth [id number]  [phone]    Call  For followup and re-evaluation.       Medications Prescribed:  Discharge Medication List as of 1/15/2019 10:29 PM    START taking these medications    Doxycycline Hyclate 100 MG Oral Cap  Take 1 capsule (100 mg total) by mouth 2

## 2019-03-01 ENCOUNTER — APPOINTMENT (OUTPATIENT)
Dept: CT IMAGING | Facility: HOSPITAL | Age: 84
End: 2019-03-01
Attending: EMERGENCY MEDICINE
Payer: MEDICARE

## 2019-03-01 PROBLEM — R41.89 UNRESPONSIVE EPISODE: Status: ACTIVE | Noted: 2019-03-01

## 2019-03-01 PROCEDURE — 70450 CT HEAD/BRAIN W/O DYE: CPT | Performed by: EMERGENCY MEDICINE

## 2019-03-01 NOTE — ED INITIAL ASSESSMENT (HPI)
Patient brought to ER via [facility] for syncopizing at dialysis, states it lasted approximately 30 seconds, denies chest pain/keyana/dizziness at this time, htn readings pta per ems

## 2019-03-01 NOTE — CONSULTS
[facility] - [facility]    Report of Consultation    Francisca Lynn Patient Status:  Emergency    1935 MRN [id number]   Location [address] Attending Janneth Wallace MD   Hosp Day # 0 PCP Hortencia Feng III     Date of Ad age and cooperative  Pulmonary: clear to auscultation bilaterally  Cardiovascular: S1, S2 normal, no murmur, click, rub or gallop, regular rate and rhythm  Abdominal: soft, non-tender; bowel sounds normal; no masses,  no organomegaly  Extremities: left BKA advanced age. EF preserved on echo 7/18, Daughter declined stress test as recently she was treated medically for the NSTEMI (she was aware of the troponin being elevated).  Prefers conservative management at my last discussion on 7/18.  - On ASA, statin, BB

## 2019-03-01 NOTE — ED PROVIDER NOTES
Patient Seen in: [facility] AND [facility] Emergency Department    History   Patient presents with:  Syncope (cardiovascular, neurologic)    Stated Complaint: syncope    HPI    28-year-old female presents for evaluation of an unresponsive episode.   While at dial components:       Result Value    Glucose 107 (*)     Sodium 134 (*)     BUN 22 (*)     Creatinine 3.12 (*)     BUN/CREA Ratio 7.1 (*)     GFR, Non- 13 (*)     GFR, -American 15 (*)     All other components within normal limits   TRO 14:36.         INDICATIONS: Weakness, syncopal episode post dialysis treatment today. TECHNIQUE: CT images were obtained without contrast material.  Automated     exposure control for dose reduction was used.            FINDINGS:     CSF SPACES: The the     carotid siphons and distal vertebral arteries.                         =====    CONCLUSION:     1. No evidence of acute intracranial hemorrhage or other acute finding.     2. 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   We recommend that you schedule follow up care with a primary care provider within the next three mon

## 2019-03-02 ENCOUNTER — APPOINTMENT (OUTPATIENT)
Dept: ULTRASOUND IMAGING | Facility: HOSPITAL | Age: 84
End: 2019-03-02
Attending: Other
Payer: MEDICARE

## 2019-03-02 PROCEDURE — 93880 EXTRACRANIAL BILAT STUDY: CPT | Performed by: OTHER

## 2019-03-02 NOTE — PLAN OF CARE
Patient admitted to and oriented to unit. No complaints at this time. Bed locked in lowest position. Call light within reach.

## 2019-03-02 NOTE — OCCUPATIONAL THERAPY NOTE
Attempted to see pt for OT evaluation, however pt off floor for Ultrasound per YANELIS Addison. Will cont to follow.

## 2019-03-02 NOTE — H&P
[facility]    PATIENT'S NAME: [facility] Core PHYSICIAN: Oswaldo Awad MD   PATIENT ACCOUNT#:   [id number]    LOCATION:  [address] [place] [address] RECORD #:   [id number]       YOB: 1935  ADMISSION DATE:       03/01/2 mouth once a week, clonidine 0.3 mg patch every 24 hours apply once weekly, hydrocodone/acetaminophen, tramadol 50 mg orally every 6 hours as needed, guaifenesin 100 mg by mouth every 4 hours as needed, polyethylene glycol 17 g orally daily, senna 8.6 mg o medications. 4.   Anemia. This is secondary to chronic kidney disease, and the patient will be on oral iron. 5.   Hyperlipidemia. Will be on atorvastatin. 6.      CHF: Preserved EF and HTN pattern ( abnormal relaxation and filling pattern).   7.

## 2019-03-02 NOTE — CONSULTS
[facility]    Report of Consultation    Arcenio Leyva Patient Status:  Emergency    1935 MRN [id number]   Location [address] Attending Flor Grey MD   Hosp Day # 1 PCP Shavonne Browne III     Date of Ad 1 patch Transdermal Weekly   docusate sodium (COLACE) cap 100 mg 100 mg Oral Daily   ferrous sulfate EC tab 325 mg 325 mg Oral BID with meals   guaiFENesin (ROBITUSSIN) 100 MG/5ML solution 100 mg 100 mg Oral Q4H PRN   HYDROcodone-acetaminophen (NORCO) 5-32 (two) times daily. glycerin, laxative, (GLYCERIN, ADULT,) 2 g Rectal Suppos Place 1 suppository rectally daily as needed. Allergies  No Known Allergies    Review of Systems:    Pertinent items are noted in HPI.     Physical Exam:   Blood pressure 12 adenoma, meningioma, or teratoma amongst other etiologies. Dedicated nonemergent MR imaging of this finding should be contemplated. 3. Senescent changes of parenchymal volume loss with sequela of chronic microangiopathy and large vessel atherosclerosis. was treated medically due to advanced age. EF preserved on echo 7/18, Daughter declined stress test as recently she was treated medically for the NSTEMI (she was aware of the troponin being elevated).  Prefers conservative management at my last discussion o

## 2019-03-02 NOTE — CONSULTS
CC: AMS / presyncope   HPI: 82 yo female with a PMH  of  ESRD, DM, CHF, gout, dyslipidemia who was admitted after she had a brief episode of 30 seconds of loss of consciousness.   Apparently she was staring at the ceiling while on dialysis but she doesn't r dysfunction.    /48 (BP Location: Right arm)   Pulse 66   Temp 98 °F (36.7 °C) (Oral)   Resp 16   Ht 5' 6\" (1.676 m)   Wt 166 lb 6.4 oz (75.5 kg)   SpO2 99%   BMI 26.86 kg/m²    EXAM  Alert, fluent speech  Cn: hearing loss, otherwise intact  Motor: a

## 2019-03-03 ENCOUNTER — APPOINTMENT (OUTPATIENT)
Dept: CV DIAGNOSTICS | Facility: HOSPITAL | Age: 84
End: 2019-03-03
Attending: INTERNAL MEDICINE
Payer: MEDICARE

## 2019-03-03 PROCEDURE — 93306 TTE W/DOPPLER COMPLETE: CPT | Performed by: INTERNAL MEDICINE

## 2019-03-03 NOTE — PHYSICAL THERAPY NOTE
PHYSICAL THERAPY EVALUATION - INPATIENT     Room Number: 338/338-A  Evaluation Date: 3/3/2019  Type of Evaluation: New  Physician Order: PT Eval and Treat    Presenting Problem: unresponsive episode  Reason for Therapy: Mobility Dysfunction and Discharge of stomach ulcers    • Neuropathy    • Renal disorder        Past Surgical History  Past Surgical History:   Procedure Laterality Date   • AMPUTATION LOW LEG,CIRCULAR      left leg   • CABG     • HYSTERECTOMY     • OPEN HEART SURGICAL PROFILE  2002       H 3-5 steps with a railing?: Total     AM-PAC Score:  Raw Score: 8   Approx Degree of Impairment: 86.62%   Standardized Score (AM-PAC Scale): 28.58   CMS Modifier (G-Code): CM    FUNCTIONAL ABILITY STATUS  Gait Assessment   Gait Assistance: (appears pt is li

## 2019-03-03 NOTE — PLAN OF CARE
Problem: Patient Centered Care  Goal: Patient preferences are identified and integrated in the patient's plan of care  Interventions:  - What would you like us to know as we care for you?  I am close with my family   - Provide timely, complete, and accurate Assess and document dressing/incision, wound bed, drain sites and surrounding tissue  - Implement wound care per orders  - Initiate isolation precautions as appropriate  - Initiate Pressure Ulcer prevention bundle as indicated  Outcome: Progressing      Co

## 2019-03-03 NOTE — OCCUPATIONAL THERAPY NOTE
OCCUPATIONAL THERAPY EVALUATION - INPATIENT     Room Number: 338/338-A  Evaluation Date: 3/3/2019  Type of Evaluation: Initial  Presenting Problem: unresponsive episode    Physician Order: IP Consult to Occupational Therapy  Reason for Therapy: ADL/IADL Dy documentation in the [facility] '6 clicks' Inpatient Daily Activity Short Form. Research supports that patients with this level of impairment may benefit from [facility].      DISCHARGE RECOMMENDATIONS  OT Discharge Recommendations: Long term care(nursing home)  OT Leisa Olea BP: (!) 165/69  BP Location: Right arm  BP Method: Automatic  Patient Position: Sitting    O2 SATURATIONS     SPO2 Ambulation on Room Air: 96          COGNITION  Orientation Level:  oriented to time, oriented to situation, oriented to person and hospital Sitting: fair  Static Standing: NT  Dynamic Standing: NT    FUNCTIONAL ADL ASSESSMENT  Grooming: Min a  Feeding: set up A  Bathing: Max A  Toileting: Dep A  Upper Extremity Dressing: Max A  Lower Extremity Dressing: Max A    Education Provided: role of OT,

## 2019-03-03 NOTE — PLAN OF CARE
Problem: Patient Centered Care  Goal: Patient preferences are identified and integrated in the patient's plan of care  Interventions:  - What would you like us to know as we care for you?  I am close with my family   - Provide timely, complete, and accurate Assess and document dressing/incision, wound bed, drain sites and surrounding tissue  - Implement wound care per orders  - Initiate isolation precautions as appropriate  - Initiate Pressure Ulcer prevention bundle as indicated  Outcome: Progressing

## 2019-03-03 NOTE — PROGRESS NOTES
Patient feeling okay today. She states that she is ready to go. She felt dizzy briefly when she sat up on the side of the bed. No confusion. Blood pressure 132/46, pulse 56, temperature 98.1 °F (36.7 °C), temperature source Oral, resp.  rate 20, height

## 2019-03-03 NOTE — SPIRITUAL CARE NOTE
provided spiritual support and companionship, empathic listening as patient shared about her life and crissy.  recited Psalm 23, prayed for and blessed patient. Patient expressed appreciation and asked if  would revisit tomorrow.

## 2019-03-03 NOTE — PROGRESS NOTES
Patient seen in follow up. Patient denies any chest pain or sob. Had cp last night.     03/03/19  1100   BP: 132/46   Pulse: 56   Resp: 20   Temp: 98.1 °F (36.7 °C)       Intake/Output Summary (Last 24 hours) at 3/3/2019 1554  Last data filed at 3/3/ hydrALAzine HCl 50 MG Oral Tab Take 1 tablet (50 mg total) by mouth 3 (three) times daily. metoprolol Tartrate 25 MG Oral Tab Take 0.5 tablets (12.5 mg total) by mouth 2x Daily(Beta Blocker). docusate sodium 100 MG Oral Cap Take 100 mg by mouth daily. CONCLUSION:  1. No evidence of acute intracranial hemorrhage or other acute finding. 2. 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   This appears similar in size and morphology as compared with 11/16/20 1 Syncope with trop leak in HD patient , CABG 2011 (LIMA to LAD, SVG to OM1, SVG to RCA),  EKG non ischemic: Recent NSTEMI, trop peak at 0.8 at [place] 6/18, patient was treated medically due to advanced age.  EF preserved on echo 7/18, Daughter declined st

## 2019-03-03 NOTE — PROGRESS NOTES
[facility]    Progress Note    Bharat Villatoro Patient Status:  Inpatient    1935 MRN [id number]   Location [facility] 3W/SW Attending Fadi Landa MD   Hosp Day # 1 PCP Viraj Cary III       Subjective:   Bharat Villatoro is docusate sodium (COLACE) cap 100 mg, 100 mg, Oral, Daily  •  ferrous sulfate EC tab 325 mg, 325 mg, Oral, BID with meals  •  guaiFENesin (ROBITUSSIN) 100 MG/5ML solution 100 mg, 100 mg, Oral, Q4H PRN  •  HYDROcodone-acetaminophen (NORCO) 5-325 MG per tab 1 02/13/2019    TP 7.5 02/13/2019    AST 15 02/13/2019    ALT 20 02/13/2019    MG 2.5 03/01/2019    TROP 0.088 () 03/01/2019     No results found for this visit on 03/01/19. Ct Brain Or Head (62059)    Result Date: 3/1/2019  CONCLUSION:  1.  No evidence -------------------------- Sinus Rhythm -First degree A-V block Bhumika = 230 Voltage criteria for LVH met.  -Nonspecific ST depression + Negative T-waves -Seen with left ventricular hypertrophy (strain) or digitalis effect consider Lateral ischemia.  ABNORMAL

## 2019-03-04 ENCOUNTER — APPOINTMENT (OUTPATIENT)
Dept: ULTRASOUND IMAGING | Facility: HOSPITAL | Age: 84
End: 2019-03-04
Attending: INTERNAL MEDICINE
Payer: MEDICARE

## 2019-03-04 NOTE — CONSULTS
[facility] HOSP - [facility]    Report of Consultation    Lauren Patel Patient Status:  Inpatient    1935 MRN [id number]   Location [facility] 3W/SW Attending Mackenzie Montero MD   Hosp Day # 3 PCP Treasure Hamilton MD     Date of Admission: cloNIDine (CATAPRES) 0.3 MG/24HR 1 patch 1 patch Transdermal Weekly   docusate sodium (COLACE) cap 100 mg 100 mg Oral Daily   ferrous sulfate EC tab 325 mg 325 mg Oral BID with meals   guaiFENesin (ROBITUSSIN) 100 MG/5ML solution 100 mg 100 mg Oral Q4H P (GLYCERIN, ADULT,) 2 g Rectal Suppos Place 1 suppository rectally daily as needed.        Allergies  No Known Allergies    Review of Systems:    A comprehensive review of systems was negative except for: Constitutional: positive for fatigue  loss of conscio TP 6.7 03/02/2019    AST 9 (L) 03/02/2019    ALT 12 (L) 03/02/2019    MG 2.7 (H) 03/02/2019    PHOS 4.3 03/02/2019    TROP 0.100 (HH) 03/02/2019         Imaging:           Impression:      Unresponsive episode    R/O TIA. ESRD. On HD X 3 / wk MWF.    Anem

## 2019-03-04 NOTE — DIETARY NOTE
ADULT NUTRITION INITIAL ASSESSMENT    Pt is at moderate nutrition risk. Pt does not meet malnutrition criteria.       RECOMMENDATIONS TO MD:  See Nutrition Intervention     NUTRITION DIAGNOSIS/PROBLEM:  Increased nutrient needs related to increased demand 68 kg (150 lb)     Wt Readings from Last 10 Encounters:  03/04/19 : 77.5 kg (170 lb 12.8 oz)  01/15/19 : 72.6 kg (160 lb)  11/13/18 : 70.8 kg (156 lb)  10/07/18 : 85 kg (187 lb 6.3 oz)  08/18/18 : 85 kg (187 lb 6.3 oz)  08/01/18 : 83.6 kg (184 lb 3.2 oz) Oral Supplements: daily  ESTIMATED NUTRITION NEEDS:  Calories: 1935 calories/day (25 calories per kg Current wt)  Protein: 93 grams protein/day (1.2 grams protein per kg Current wt)    MONITOR AND EVALUATE/NUTRITION GOALS:  - Food and Nutrient Intake:

## 2019-03-04 NOTE — CM/SW NOTE
LOTTIE received an MDO re discharge planning. LOTTIE informed that the pt is from [address] [place] [address].. Lottie Botello from [facility] who confirmed that the pt is a long term resident. Lottie sent updates.      LOTTIE spoke with pt's daughter Kemi Cintron [phone] who stat

## 2019-03-04 NOTE — PROGRESS NOTES
[facility]    Progress Note    Seble Led Patient Status:  Inpatient    1935 MRN [id number]   Location [facility] 3W/SW Attending Che Reis MD   Hosp Day # 2 PCP [facility]       Subjective:   Seble Led is 0.8 mg, 1 tablet, Oral, Daily with breakfast  •  cloNIDine (CATAPRES) 0.3 MG/24HR 1 patch, 1 patch, Transdermal, Weekly  •  docusate sodium (COLACE) cap 100 mg, 100 mg, Oral, Daily  •  ferrous sulfate EC tab 325 mg, 325 mg, Oral, BID with meals  •  guaiFEN 03/02/2019    CREATSERUM 4.98 (H) 03/02/2019    BUN 42 (H) 03/02/2019     (H) 03/02/2019    CA 8.7 03/02/2019    ALB 2.9 (L) 03/02/2019    ALKPHO 129 03/02/2019    BILT 0.2 03/02/2019    TP 6.7 03/02/2019    AST 9 (L) 03/02/2019    ALT 12 (L) 03/02/

## 2019-03-04 NOTE — PROGRESS NOTES
Patient seen in follow up. Patient denies any chest pain or sob. Had cp last night.     03/04/19  0818   BP: 153/57   Pulse: 61   Resp: 20   Temp: 98.4 °F (36.9 °C)       Intake/Output Summary (Last 24 hours) at 3/4/2019 1223  Last data filed at 3/4/ hydrALAzine HCl 50 MG Oral Tab Take 1 tablet (50 mg total) by mouth 3 (three) times daily. metoprolol Tartrate 25 MG Oral Tab Take 0.5 tablets (12.5 mg total) by mouth 2x Daily(Beta Blocker). docusate sodium 100 MG Oral Cap Take 100 mg by mouth daily. 1 Syncope with trop leak in HD patient , CABG 2011 (LIMA to LAD, SVG to OM1, SVG to RCA),  EKG non ischemic: Recent NSTEMI, trop peak at 0.8 at [facility] 6/18, patient was treated medically due to advanced age.  EF preserved on echo 7/18, Daughter declined st

## 2019-03-05 ENCOUNTER — APPOINTMENT (OUTPATIENT)
Dept: ULTRASOUND IMAGING | Facility: HOSPITAL | Age: 84
End: 2019-03-05
Attending: INTERNAL MEDICINE
Payer: MEDICARE

## 2019-03-05 PROCEDURE — 93970 EXTREMITY STUDY: CPT | Performed by: INTERNAL MEDICINE

## 2019-03-05 NOTE — PLAN OF CARE
Patient returned from dialysis at [phone]. Per diaysis 3L was removed. Incontinent care provided promptly. Repositioned for comfort. Safety plan in place.

## 2019-03-05 NOTE — PROGRESS NOTES
[facility]    Progress Note    Addy Maria Patient Status:  Inpatient    1935 MRN [id number]   Location [facility] 3W/SW Attending Bryan Hathaway MD   Hosp Day # 3 PCP Jennie Bunn MD       Subjective:   Addy Maria mg, 12.5 mg, Oral, 2x Daily(Beta Blocker)  •  amLODIPine Besylate (NORVASC) tab 10 mg, 10 mg, Oral, Daily  •  Isosorbide Mononitrate ER (IMDUR) 24 hr tab 60 mg, 60 mg, Oral, Daily  •  aspirin chewable tab 81 mg, 81 mg, Oral, Daily  •  atorvastatin (LIPITOR distress. Carotid US negative and cardiac stress likely tomorrow. Possible dialysis tomorrow.     03/04/19  Patient asymptomatic and currently no intervention per cardiology after discussing with daughter.   Dialysis today and UF to goal.  Full medical ma

## 2019-03-05 NOTE — PAYOR COMM NOTE
[facility]  --------------  ADMISSION REVIEW     Payor: [address] [place] [address] #:  [id number]  Authorization Number: [id number]      ED Provider Notes      Patient Seen in: [facility] Emergency Department    History   Patient presents w components:    Troponin 0.088 (*)     All other components within normal limits   CBC W/ DIFFERENTIAL - Abnormal; Notable for the following components:    MCHC 30.7 (*)     RDW-SD 55.0 (*)     RDW 16.4 (*)     PLT 97.0 (*)     Lymphocyte Absolute 0.97 (*) are dilated, but remain     commensurate in caliber, consistent with     parenchymal volume loss of a degree that is appropriate for age. No     hydrocephalus, subarachnoid hemorrhage, or effacement of the basal     cisterns is appreciated.  There is no ext meningioma, or     teratoma amongst other etiologies. Dedicated nonemergent MR imaging of     this finding should be contemplated.     3. Senescent changes of parenchymal volume loss with sequela of chronic     microangiopathy and large vessel atherosclero during the episode. EKG is pretty unremarkable. We will rule out second set of troponins and will consult Cardiology and manage conservatively as the patient is not desirous of any aggressive interventional procedures.   Continue full medical management i 5000 UNIT/ML injection 5,000 Units     Date Action Dose Route User    3/4/2019 2038 Given 5000 Units Subcutaneous (Left Lower Abdomen) Edward Blancas, RN      hydrALAzine HCl (APRESOLINE) tab 50 mg     Date Action Dose Route User    3/5/2019 1001 Given 5 CARDS  1 Syncope with trop leak in HD patient , CABG 2011 (LIMA to LAD, SVG to OM1, SVG to RCA),  EKG non ischemic: Recent NSTEMI, trop peak at 0.8 at [facility] 6/18, patient was treated medically due to advanced age.  EF preserved on echo 7/18, Daughter brayden cardiology.     03/03/19  Patient was more somnolent today but was not in distress. Carotid US negative and cardiac stress likely tomorrow. Possible dialysis tomorrow.     3/4 CARDS   3/4/18 patient BP better controlled, echo normal EF , daughter not sure

## 2019-03-05 NOTE — CONSULTS
[facility]    Report of Consultation    Tamie Quinteros Patient Status:  Inpatient    1935 MRN [id number]   Location [facility] 3W/SW Attending Renato Wood MD   Hosp Day # 3 PCP Steve Pena MD     Date of Admissio HYDROcodone-acetaminophen (NORCO) 5-325 MG per tab 1 tablet, 1 tablet, Oral, Q4H PRN  •  hydrALAzine HCl (APRESOLINE) tab 50 mg, 50 mg, Oral, Q8H [place]WTON  •  metoprolol Tartrate (LOPRESSOR) tab 12.5 mg, 12.5 mg, Oral, 2x Daily(Beta Blocker)  •  amLODIPine Besy (NEPHRO-RICKI RX) 1 MG Oral Tab Take 1 tablet by mouth daily with breakfast. Disp:  Rfl:  3/1/2019 at Unknown time   allopurinol 100 MG Oral Tab Take 100 mg by mouth daily.  Disp:  Rfl:  3/1/2019 at Unknown time   HYDROcodone-acetaminophen 5-325 MG Oral Tab palpitation. Full range of motion to flexion and extension, lateral rotation and lateral flexion of cervical spine. No JVD. Supple.  Right jugular permcath  Lungs: Clear  Cardiac: RRR  Groins: Good femoral pulses  Abdomen:  Soft, non-distended, non-tender

## 2019-03-05 NOTE — CM/SW NOTE
Addendum 6:25am    Informed by Shelia Paget from [facility] that they are unable to accept the pt.     11:20am     followed up with the [facility] liaison Hilary Cabrera [phone] regarding the status of the referral. Shelia Paget stated that she is still pr

## 2019-03-05 NOTE — PHYSICAL THERAPY NOTE
PHYSICAL THERAPY TREATMENT NOTE - INPATIENT     Room Number: 338/338-A       Presenting Problem: unresponsive episode    Problem List  Principal Problem:    Unresponsive episode      PHYSICAL THERAPY ASSESSMENT   Patient received supine in bed, agreeable t SHORT FORM - BASIC MOBILITY  How much difficulty does the patient currently have. ..  -   Turning over in bed (including adjusting bedclothes, sheets and blankets)?: A Lot   -   Sitting down on and standing up from a chair with arms (e.g., wheelchair, bedsi

## 2019-03-05 NOTE — PLAN OF CARE
Problem: Patient Centered Care  Goal: Patient preferences are identified and integrated in the patient's plan of care  Interventions:  - What would you like us to know as we care for you?  I am close with my family   - Provide timely, complete, and accurate Assess and document dressing/incision, wound bed, drain sites and surrounding tissue  - Implement wound care per orders  - Initiate isolation precautions as appropriate  - Initiate Pressure Ulcer prevention bundle as indicated  Outcome: Progressing      Pr learning needs (meds, wound care, etc)  - Arrange for interpreters to assist at discharge as needed  - Consider post-discharge preferences of patient/family/discharge partner  - Complete POLST form as appropriate  - Assess patient's ability to be responsib

## 2019-03-05 NOTE — PROGRESS NOTES
Patient seen in follow up. Patient denies any chest pain or sob.      03/05/19  0949   BP: (!) 171/50   Pulse: 62   Resp: 18   Temp: 98 °F (36.7 °C)       Intake/Output Summary (Last 24 hours) at 3/5/2019 1117  Last data filed at 3/5/2019 9141  Gross Heparin Sodium (Porcine) 5000 UNIT/ML injection 5,000 Units 5,000 Units Subcutaneous Q8H [address]   Pantoprazole Sodium (PROTONIX) EC tab 20 mg 20 mg Oral QAM AC       Medications Prior to Admission:  aspirin 81 MG Oral Chew Tab Chew 1 tablet (81 mg total) by m  03/04/2019    CA 8.5 03/04/2019    ALB 3.3 03/04/2019    ALKPHO 142 03/04/2019    BILT 0.2 03/04/2019    TP 7.5 03/04/2019    AST 9 03/04/2019    ALT 12 03/04/2019    TSH 2.570 03/04/2019    B12 1,850 03/04/2019        1 Syncope with trop leak in

## 2019-03-05 NOTE — PROGRESS NOTES
03/04/19  0818 03/04/19  1248 03/04/19  1500 03/04/19 2037   BP: 153/57 127/53 143/51 149/49   Pulse: 61 61 63 60   Resp: 20 20 20 16   Temp: 98.4 °F (36.9 °C) 97 °F (36.1 °C) 98.4 °F (36.9 °C) 98.3 °F (36.8 °C)   TempSrc: Oral Oral Oral Oral   SpO2: 99%

## 2019-03-05 NOTE — OCCUPATIONAL THERAPY NOTE
OCCUPATIONAL THERAPY TREATMENT NOTE - INPATIENT        Room Number: 338/338-A           Presenting Problem: unresponsive episode    Problem List  Principal Problem:    Unresponsive episode      OCCUPATIONAL THERAPY ASSESSMENT     Patient tolerated minimal lower body clothing?: Total  -   Bathing (including washing, rinsing, drying)?: Total  -   Toileting, which includes using toilet, bedpan or urinal? : Total  -   Putting on and taking off regular upper body clothing?: A Lot  -   Taking care of personal jeanna

## 2019-03-05 NOTE — PROGRESS NOTES
[facility] Patient Status:  Inpatient    1935 MRN [id number]   Location [facility] 3W/SW Attending Milka Green MD   Hosp Day # 4 PCP Delila Goodpasture, MD Arman Heard is a 81year old female patient

## 2019-03-06 ENCOUNTER — APPOINTMENT (OUTPATIENT)
Dept: NUCLEAR MEDICINE | Facility: HOSPITAL | Age: 84
End: 2019-03-06
Attending: INTERNAL MEDICINE
Payer: MEDICARE

## 2019-03-06 ENCOUNTER — APPOINTMENT (OUTPATIENT)
Dept: GENERAL RADIOLOGY | Facility: HOSPITAL | Age: 84
End: 2019-03-06
Attending: SURGERY
Payer: MEDICARE

## 2019-03-06 ENCOUNTER — APPOINTMENT (OUTPATIENT)
Dept: CV DIAGNOSTICS | Facility: HOSPITAL | Age: 84
End: 2019-03-06
Attending: INTERNAL MEDICINE
Payer: MEDICARE

## 2019-03-06 PROCEDURE — 93017 CV STRESS TEST TRACING ONLY: CPT | Performed by: INTERNAL MEDICINE

## 2019-03-06 PROCEDURE — 78452 HT MUSCLE IMAGE SPECT MULT: CPT | Performed by: INTERNAL MEDICINE

## 2019-03-06 PROCEDURE — 71046 X-RAY EXAM CHEST 2 VIEWS: CPT | Performed by: SURGERY

## 2019-03-06 NOTE — PROGRESS NOTES
03/05/19  1030 03/05/19  1155 03/05/19  1604 03/05/19 2018   BP: 154/52 132/65 140/61 (!) 171/82   Pulse: 59 62 67 77   Resp:  18 18 18   Temp:    98.5 °F (36.9 °C)   TempSrc:    Oral   SpO2:  98% 98% 96%   Weight:       Height:             Body mass ind

## 2019-03-06 NOTE — PROGRESS NOTES
Patient seen in follow up. Patient denies any chest pain or sob. Overnight Mobitz 1 also abd pain and constipation. DW DTR.   03/06/19  0800   BP: 138/45   Pulse: 63   Resp:    Temp:    Time spent > 35 min.     Intake/Output Summary (Last 24 hours) a NEPHRO-RICKI tab 0.8 mg 1 tablet Oral Daily with breakfast   cloNIDine (CATAPRES) 0.3 MG/24HR 1 patch 1 patch Transdermal Weekly   docusate sodium (COLACE) cap 100 mg 100 mg Oral Daily   ferrous sulfate EC tab 325 mg 325 mg Oral BID with meals   guaiFENesin Senna 8.8 MG/5ML Oral Syrup Take 5 mL by mouth 2 (two) times daily. glycerin, laxative, (GLYCERIN, ADULT,) 2 g Rectal Suppos Place 1 suppository rectally daily as needed.      Us Vein Map Upper Extremity Bilat (cpt=93970)     Result Date: 3/5/2019  CONCLU

## 2019-03-06 NOTE — PROGRESS NOTES
[facility]    Progress Note    Carmel Aquino Patient Status:  Inpatient    1935 MRN [id number]   Location [facility] 3W/SW Attending Darrell Pederson MD   Hosp Day # 4 PCP Hermann Vicente MD       Subjective:   Carmel Aquino (APRESOLINE) tab 50 mg, 50 mg, Oral, Q8H [place]  •  metoprolol Tartrate (LOPRESSOR) tab 12.5 mg, 12.5 mg, Oral, 2x Daily(Beta Blocker)  •  amLODIPine Besylate (NORVASC) tab 10 mg, 10 mg, Oral, Daily  •  Isosorbide Mononitrate ER (IMDUR) 24 hr tab 60 mg, 60 mg pending per cardiology.     03/03/19  Patient was more somnolent today but was not in distress. Carotid US negative and cardiac stress likely tomorrow.   Possible dialysis tomorrow.     03/04/19  Patient asymptomatic and currently no intervention per cardi

## 2019-03-06 NOTE — CM/SW NOTE
FRANCHESCA left a message with the pt's dtr. Nicole Lopez [phone] regarding [facility] being unable to accept the pt. FRANCHESCA also notified Nicole Lopez in the voicemail, if there isn't another accepting facility the pt. Will need to return to [facility].

## 2019-03-06 NOTE — PROGRESS NOTES
[facility]    Progress Note    Miguel Angel Wayne Patient Status:  Inpatient    1935 MRN [id number]   Location [facility] 3W/SW Attending Vitaly Olea MD   Hosp Day # 4 PCP Janki Chatman MD     Subjective:Met with Cristian Baig

## 2019-03-06 NOTE — PROGRESS NOTES
[facility]    Progress Note    Lesia Alvarez Patient Status:  Inpatient    1935 MRN [id number]   Location [facility] 3W/SW Attending Franchesca Herman MD   Hosp Day # 5 PCP Kaylynn Todd MD       Subjective:   Lesia Alvarez mg, Oral, BID  •  HYDROcodone-acetaminophen (NORCO) 5-325 MG per tab 1 tablet, 1 tablet, Oral, Q4H PRN  •  metoprolol Tartrate (LOPRESSOR) tab 12.5 mg, 12.5 mg, Oral, 2x Daily(Beta Blocker)  •  amLODIPine Besylate (NORVASC) tab 10 mg, 10 mg, Oral, Daily  • cardiology.     03/03/19  Patient was more somnolent today but was not in distress. Carotid US negative and cardiac stress likely tomorrow.   Possible dialysis tomorrow.     03/04/19  Patient asymptomatic and currently no intervention per cardiology after Bilateral upper extremity venous mapping performed.     Dictated by (CST): Lauren Mccall MD on 3/05/2019 at 15:29     Approved by (CST): Lauren Mccall MD on 3/05/2019 at 15:56                        Hema Stewart MD  3/6/2019

## 2019-03-06 NOTE — IMAGING NOTE
1025 - Patient here for inpatient regadenoson nuclear stress test. Abnormal resting ECG. Patient asymptomatic. Dr. Dyan Rushing came down to review ECG and assess patient.  Orders received to change test to Dobutamine nuclear stress test. Dr. Dyan Rushing will supe

## 2019-03-07 NOTE — PROGRESS NOTES
[facility] HOSP - [facility]    Progress Note    Seble Led Patient Status:  Observation    1935 MRN [id number]   Location [facility] 3W/SW Attending Che Reis MD   Hosp Day # 6 PCP Hazel Dodd MD       Subjective:   Dianne Ip mg, Oral, BID  •  lactulose (CHRONULAC) 10 GM/15ML solution 20 g, 20 g, Oral, TID  •  oxandrolone (OXANDRIN) tab 5 mg, 5 mg, Oral, BID  •  HYDROcodone-acetaminophen (NORCO) 5-325 MG per tab 1 tablet, 1 tablet, Oral, Q4H PRN  •  metoprolol Tartrate (Ebony Kaity suppository.     03/02/19  Patient dialysed on Friday and no acute indication for dialysis today. Echocardiogram and lexiscan MPI pending per cardiology.     03/03/19  Patient was more somnolent today but was not in distress.   Carotid US negative and card Atherosclerosis. 5. Demineralization. 6. Scoliosis. 7. Osteoarthritis. 8. 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 9. Overall little change from July 26, 2018. Dictated by (CST): Lazaro Valenzuela MD on 3/06/2019 at 11:01     Approved by (CST):  Lazaro Valenzuela,

## 2019-03-07 NOTE — PROGRESS NOTES
Patient seen in follow up. Long dw dtr. 03/07/19  1025   BP: 145/46   Pulse: 75   Resp:    Temp: 98.9 °F (37.2 °C)   Time spent > 35 min.     Intake/Output Summary (Last 24 hours) at 3/7/2019 1306  Last data filed at 3/7/2019 0615  Gross per 24 ho PEG 3350 (MIRALAX) powder packet 17 g 17 g Oral Daily   Senna (SENOKOT) tab 8.6 mg 8.6 mg Oral BID   Heparin Sodium (Porcine) 5000 UNIT/ML injection 5,000 Units 5,000 Units Subcutaneous Q8H [facility]   Pantoprazole Sodium (PROTONIX) EC tab 20 mg 20 mg Oral QAM A CONCLUSION: 1. Limited inspiration. 2. Prominent markings. 3. Cardiomegaly. 4. Atherosclerosis. 5. Demineralization. 6. Scoliosis. 7. Osteoarthritis. 8. 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 9. Overall little change from July 26, 2018. Dictated by (CST):  Roberto London My also need to stop beta blocker. Increase hydralazine instead. 3/7   Abnormal stress test. Small apical defect. Had scheduled for cardiac cath today. Daughter and patient declined at this time. Small defect. May continue with medical management.

## 2019-03-07 NOTE — CM/SW NOTE
Patient failed inpatient criteria. Second level of review completed and supports observation. UR committee in agreement. Discussed with Dr. Bandar Morris  who approves observation status. MOON to be given to the patient and order written.

## 2019-03-07 NOTE — PROGRESS NOTES
[facility]    Progress Note    Secundino Ahumada Patient Status:  Observation    1935 MRN [id number]   Location [facility] 3W/SW Attending Ileana Amor, [address] [facility] Se Day # 6 PCP Faith Santa MD        Subjective:   Nallely Echols ALT 12 (L) 03/06/2019    PTT 33.7 03/06/2019    INR 0.94 03/06/2019    TSH 2.570 03/04/2019    MG 2.7 (H) 03/02/2019    PHOS 3.3 03/06/2019    TROP 0.100 (HH) 03/02/2019    B12 1,850 (H) 03/04/2019       Xr Chest Pa + Lat Chest (cpt=71046)    Result Date:

## 2019-03-07 NOTE — PAYOR COMM NOTE
Place in observation Once (Order #[id number]) on 3/7/19  --------------  CONTINUED STAY REVIEW    Payor: [facility] [employer] [place] #:  [id number]  Authorization Number: [id number]

## 2019-03-07 NOTE — CM/SW NOTE
SW attempted to leave a message re discharge planning with the pt's daughter Joseph Postal [phone] but her voice mailbox is full. Social work/case management to remain available for support and/or discharge planning.     Dionte Cordova, [phone] Kirsty Lezama

## 2019-03-08 NOTE — OCCUPATIONAL THERAPY NOTE
Pt in dialysis. Not available for OT treatment. Will reschedule as able.    Ofe Nelson MA, OTR/L  Occupational Therapist

## 2019-03-08 NOTE — PROGRESS NOTES
Patient seen in follow up. Doing well     03/08/19  1214   BP: 150/63   Pulse: 98   Resp:    Temp:    Time spent > 35 min.     Intake/Output Summary (Last 24 hours) at 3/8/2019 1518  Last data filed at 3/8/2019 1400  Gross per 24 hour   Intake 160 ml Senna (SENOKOT) tab 8.6 mg 8.6 mg Oral BID   Heparin Sodium (Porcine) 5000 UNIT/ML injection 5,000 Units 5,000 Units Subcutaneous Q8H [facility]   Pantoprazole Sodium (PROTONIX) EC tab 20 mg 20 mg Oral QAM AC       Medications Prior to Admission:  aspirin 81 MG O 1 Syncope with trop leak in HD patient , CABG 2011 (LIMA to LAD, SVG to OM1, SVG to RCA),  EKG non ischemic: Recent NSTEMI, trop peak at 0.8 at [place] 6/18, patient was treated medically due to advanced age.  EF preserved on echo 7/18, Daughter declined st

## 2019-03-08 NOTE — CM/SW NOTE
Addendum 3:58    FRANCHESCA informed that [address] have accepted pending insurance authorization.  FRANCHESCA spoke with pt's daughter Mar Aldrich who stated that she is going to tour each facility tonight and the decide on a facility.     3:02pm    FRANCHESCA

## 2019-03-08 NOTE — PLAN OF CARE
CARDIOVASCULAR - ADULT    • Maintains optimal cardiac output and hemodynamic stability Not Progressing          DISCHARGE PLANNING    • Discharge to home or other facility with appropriate resources Progressing        PAIN - ADULT    • Verbalizes/displays

## 2019-03-08 NOTE — PROGRESS NOTES
Patients daughter Jennie Nguyen does not want to make any decisions regarding an angiogram or AV fistula placement at this time as she wants to talk with doctors further regarding risk/ benefit.  Jennie Nguyen states she does not want her mother going back to [facility]

## 2019-03-08 NOTE — PROGRESS NOTES
[facility]    Progress Note    Seble Led Patient Status:  Observation    1935 MRN [id number]   Location [facility] 3W/SW Attending Che Reis MD   Hosp Day # 6 PCP Hazel Dodd MD       Subjective:   Dianne Ip solution 1,000 mg, 1,000 mg, Oral, BID  •  lactulose (CHRONULAC) 10 GM/15ML solution 20 g, 20 g, Oral, TID  •  oxandrolone (OXANDRIN) tab 5 mg, 5 mg, Oral, BID  •  HYDROcodone-acetaminophen (NORCO) 5-325 MG per tab 1 tablet, 1 tablet, Oral, Q4H PRN  •  met dialysis today. Echocardiogram and lexiscan MPI pending per cardiology.     03/03/19  Patient was more somnolent today but was not in distress. Carotid US negative and cardiac stress likely tomorrow.   Possible dialysis tomorrow.     03/04/19  Patient asy

## 2019-03-08 NOTE — PROGRESS NOTES
03/07/19  2106 03/08/19  0300 03/08/19  0628 03/08/19  0745   BP: 136/53  145/64 145/56   Pulse: 62  68 66   Resp: 18  18 18   Temp: 98.5 °F (36.9 °C)  98.4 °F (36.9 °C) 98.7 °F (37.1 °C)   TempSrc: Oral  Oral Oral   SpO2: 97%  98% 97%   Weight:  165 lb 3

## 2019-03-09 NOTE — PROGRESS NOTES
[facility]    Progress Note    Morris Fuentes Patient Status:  Observation    1935 MRN [id number]   Location [facility] 3W/SW Attending Nikita Melvin MD   Hosp Day # 6 PCP Louie Melendez MD     Subjective: Ready for Liz

## 2019-03-09 NOTE — PROGRESS NOTES
[facility] Patient Status:  Observation    1935 MRN [id number]   Location [facility] 3W/SW Attending Ileana Amor MD   Hosp Day # 6 PCP Faith Santa MD     Secundino Ahumada is a 81year old female patie 4.11   --    HGB  10.8*  11.7*  11.3*  11.3*   HCT  36.0  38.7  37.7  37.9   MCV  92.3  91.5  91.7   --    MCH  27.7  27.7  27.5   --    MCHC  30.0*  30.2*  30.0*   --    RDW  16.4*  16.4*  16.6*   --    NEPRELIM  3.03  2.45  3.82   --    WBC  6.0  3.9*  5

## 2019-03-09 NOTE — PLAN OF CARE
1720-After eating her dinner in bed, patient started c/o right mid chest pain, sharp and reproducible. VSS, no changes on tele. Patient repositioned and Norco given at [phone] per patient request, reports feeling better after repositioning.  Will continue to m

## 2019-03-09 NOTE — PROGRESS NOTES
Patient seen in follow up. Doing well     03/09/19  1720   BP: 146/62   Pulse: 75   Resp: 20   Temp:    Time spent > 35 min.     Intake/Output Summary (Last 24 hours) at 3/9/2019 1732  Last data filed at 3/9/2019 0700  Gross per 24 hour   Intake 660 m Heparin Sodium (Porcine) 5000 UNIT/ML injection 5,000 Units 5,000 Units Subcutaneous Q8H [address]   Pantoprazole Sodium (PROTONIX) EC tab 20 mg 20 mg Oral QAM AC       Medications Prior to Admission:  aspirin 81 MG Oral Chew Tab Chew 1 tablet (81 mg total) by m BILT 0.2 03/09/2019    TP 6.9 03/09/2019    AST 16 03/09/2019    ALT 17 03/09/2019        1 Syncope with trop leak in HD patient , CABG 2011 (LIMA to LAD, SVG to OM1, SVG to RCA),  EKG non ischemic: Recent NSTEMI, trop peak at 0.8 at [place] 6/18, patient

## 2019-03-09 NOTE — PROGRESS NOTES
[facility]    Progress Note    Nika Morton Patient Status:  Observation    1935 MRN [id number]   Location [facility] 3W/SW Attending Santiago Vallejo MD   Hosp Day # 6 PCP Talat Martinez MD       Subjective:   Shawna Shirley lactulose (CHRONULAC) 10 GM/15ML solution 20 g, 20 g, Oral, TID  •  oxandrolone (OXANDRIN) tab 5 mg, 5 mg, Oral, BID  •  HYDROcodone-acetaminophen (NORCO) 5-325 MG per tab 1 tablet, 1 tablet, Oral, Q4H PRN  •  metoprolol Tartrate (LOPRESSOR) tab 12.5 mg, 1 somnolent today but was not in distress. Carotid US negative and cardiac stress likely tomorrow. Possible dialysis tomorrow.     03/04/19  Patient asymptomatic and currently no intervention per cardiology after discussing with daughter.   Dialysis today a

## 2019-03-10 ENCOUNTER — ANESTHESIA (OUTPATIENT)
Dept: SURGERY | Facility: HOSPITAL | Age: 84
End: 2019-03-10
Payer: MEDICARE

## 2019-03-10 ENCOUNTER — ANESTHESIA EVENT (OUTPATIENT)
Dept: SURGERY | Facility: HOSPITAL | Age: 84
End: 2019-03-10
Payer: MEDICARE

## 2019-03-10 ENCOUNTER — APPOINTMENT (OUTPATIENT)
Dept: CT IMAGING | Facility: HOSPITAL | Age: 84
End: 2019-03-10
Attending: HOSPITALIST
Payer: MEDICARE

## 2019-03-10 PROCEDURE — 70450 CT HEAD/BRAIN W/O DYE: CPT | Performed by: HOSPITALIST

## 2019-03-10 RX ORDER — EPHEDRINE SULFATE 50 MG/ML
INJECTION, SOLUTION INTRAVENOUS AS NEEDED
Status: DISCONTINUED | OUTPATIENT
Start: 2019-03-10 | End: 2019-03-10 | Stop reason: SURG

## 2019-03-10 RX ORDER — SODIUM CHLORIDE 9 MG/ML
INJECTION, SOLUTION INTRAVENOUS CONTINUOUS PRN
Status: DISCONTINUED | OUTPATIENT
Start: 2019-03-10 | End: 2019-03-10 | Stop reason: SURG

## 2019-03-10 RX ORDER — CEFAZOLIN SODIUM/WATER 2 G/20 ML
SYRINGE (ML) INTRAVENOUS AS NEEDED
Status: DISCONTINUED | OUTPATIENT
Start: 2019-03-10 | End: 2019-03-10 | Stop reason: SURG

## 2019-03-10 RX ADMIN — ONDANSETRON 4 MG: 2 INJECTION INTRAMUSCULAR; INTRAVENOUS at 09:13:00

## 2019-03-10 RX ADMIN — SODIUM CHLORIDE: 9 INJECTION, SOLUTION INTRAVENOUS at 09:10:00

## 2019-03-10 RX ADMIN — EPHEDRINE SULFATE 2.5 MG: 50 INJECTION, SOLUTION INTRAVENOUS at 08:20:00

## 2019-03-10 RX ADMIN — CEFAZOLIN SODIUM/WATER 2 G: 2 G/20 ML SYRINGE (ML) INTRAVENOUS at 08:14:00

## 2019-03-10 RX ADMIN — SODIUM CHLORIDE: 9 INJECTION, SOLUTION INTRAVENOUS at 07:54:00

## 2019-03-10 NOTE — ANESTHESIA PROCEDURE NOTES
ANESTHESIA INTUBATION  Date/Time: 3/10/2019 8:04 AM  Urgency: elective    Airway not difficult    General Information and Staff    Patient location during procedure: OR  Anesthesiologist: Elba Oconnor MD  Performed: anesthesiologist     Rae and Lui Chauhan

## 2019-03-10 NOTE — BRIEF OP NOTE
Pre-Operative Diagnosis: End stage renal disease ([facility] [address] [facility] [N18.6]     Post-Operative Diagnosis: [facility] [N18.6]      Procedure Performed:   Procedure(s):  LEFT ARM BRACHIAL ARTERY BRACHIAL VEIN ARTERIOVENOUS FISTULA CREATION 1ST STAGE

## 2019-03-10 NOTE — PROGRESS NOTES
[facility] HOSPITALIST  RAPID RESPONSE NOTE     Peola Rho Patient Status:  Observation    1935 MRN [id number]   Location [facility] 3W/SW Attending Isha Merida MD   Hosp Day # 6 PCP Merlin Colmenares MD     Reason for RRT: Esme Aleman

## 2019-03-10 NOTE — PROGRESS NOTES
[address] Patient Status:  Observation    1935 MRN [id number]   Location [place] [address] Attending Renato Wood MD   [facility] Day # 6 PCP Link MD Jean     Tamie Quinteros is a 81year old fem 26.0  28.0 28.0    BUN Latest Ref Range: 7 - 18 mg/dL 24 (H)  19 (H) 19 (H)    CREATININE Latest Ref Range: 0.55 - 1.02 mg/dL 4.12 (H)  3.83 (H) 4.16 (H)    CALCIUM Latest Ref Range: 8.5 - 10.1 mg/dL 8.8  9.0 9.3    BUN/CREAT Ratio Latest Ref Range: 10.0 - Prelim Neutrophil Abs Latest Ref Range: 1.50 - 7.70 x10 (3) uL 3.82       Neutrophils Absolute Latest Ref Range: 1.50 - 7.70 x10(3) uL 3.82       Lymphocytes Absolute Latest Ref Range: 1.00 - 4.00 x10(3) uL 1.27       Monocytes Absolute Latest Ref Range

## 2019-03-10 NOTE — ANESTHESIA PREPROCEDURE EVALUATION
Anesthesia PreOp Note    HPI:     Lauren Patel is a 81year old female who presents for preoperative consultation requested by: Ravin Manzo MD    Date of Surgery: 3/1/2019 - 3/10/2019    Procedure(s):  AV FISTULA  Indication: End stage renal disease (HC ferrous sulfate 325 (65 FE) MG Oral Tab EC Take 325 mg by mouth 2 (two) times daily with meals.  Disp:  Rfl:  3/1/2019 at Unknown time   B Complex-C-Folic Acid (NEPHRO-RICKI RX) 1 MG Oral Tab Take 1 tablet by mouth daily with breakfast. Disp:  Rfl:  3/1/20 Ariane Portillo MD 0.5 mg at 03/09/19 1952   dextrose 5 % and 0.9 % NaCl infusion  Intravenous Continuous Helder Romero MD Last Rate: 10 mL/hr at 03/09/19 1601   [MAR Hold] ondansetron HCl (ZOFRAN) injection 4 mg 4 mg Intravenous Q6H PRN Hao Feldman Hold] Heparin Sodium (Porcine) 5000 UNIT/ML injection 5,000 Units 5,000 Units Subcutaneous [facility] Vitaly Olea MD Stopped at 03/10/19 0600   [MAR Hold] Pantoprazole Sodium (PROTONIX) EC tab 20 mg 20 mg Oral QAM  Vitaly Olea MD Stopp RBC 3.91 03/09/2019    HGB 11.0 (L) 03/09/2019    HCT 35.5 03/09/2019    MCV 90.8 03/09/2019    MCH 28.1 03/09/2019    MCHC 31.0 03/09/2019    RDW 17.1 (H) 03/09/2019    .0 03/09/2019    MPV 9.4 01/23/2019     Lab Results   Component Value Date soft.  Bowel sounds: normal.     Other findings: One tooth        Anesthesia Plan:   ASA:  4  Plan:   General  Airway:  ETT  Informed Consent Plan and Risks Discussed With:  Patient and child/children  Discussed plan with:  Attending and surgeon  Provider

## 2019-03-10 NOTE — SPIRITUAL CARE NOTE
Patient lost ability to speak. I saw her after 15 minutes, she was semi stable and was able to answer me and say thank. Her speech was almost normal but she kept complaining about her left hand because it was itching.

## 2019-03-10 NOTE — PROGRESS NOTES
Patient seen post op. Noted reviewed. Had RRT after my visit. At the time she was complaining of R CP that had resolved. Also of L arm numbness but no other motor deficit.  Time spent > 35 min total.     03/10/19  1440   BP: 115/60   Pulse: 102   Resp docusate sodium (COLACE) cap 100 mg 100 mg Oral Daily   guaiFENesin (ROBITUSSIN) 100 MG/5ML solution 100 mg 100 mg Oral Q4H PRN   PEG 3350 (MIRALAX) powder packet 17 g 17 g Oral Daily   Senna (SENOKOT) tab 8.6 mg 8.6 mg Oral BID   Heparin Sodium (Porcine) WBC 6.6 03/09/2019    HGB 11.0 03/09/2019    HCT 35.5 03/09/2019    .0 03/09/2019    CREATSERUM 4.16 03/09/2019    BUN 19 03/09/2019     03/09/2019    K 4.1 03/09/2019     03/09/2019    CO2 28.0 03/09/2019     03/09/2019    CA 9. Small defect. May continue with medical management. May proceed with surgery as planned tomorrow. 3/8 VS stable continue same treatment   increase Imdur to 90 daily    3/9 BP stable today. Plan for surgery tomorrow.     3/10 Unresponsive episode with nor

## 2019-03-10 NOTE — SIGNIFICANT EVENT
RRT    *See RRT Documentation Record*    Reason the RRT was called: patient speaking full sentences last at 1330; at 1440 patient pt not speaking only a blank stare/ unresponsive and slight twitching  Assessment of patient leading up to RRT: BP: 115/60 HR

## 2019-03-10 NOTE — ANESTHESIA POSTPROCEDURE EVALUATION
Patient: Merry Clemente    Procedure Summary     Date:  03/10/19 Room / Location:  [facility] OR 03 / [facility] OR    Anesthesia Start:  8681 Anesthesia Stop:      Procedure:  AV FISTULA (Left ) Diagnosis:       End stage renal disease ([facility] [address]      (End stage denise

## 2019-03-10 NOTE — PROGRESS NOTES
[facility]    Progress Note    Nika Morton Patient Status:  Observation    1935 MRN [id number]   Location [facility] 3W/SW Attending Santiago Vallejo MD   Hosp Day # 6 PCP Talat Martinez MD       Subjective:   Shawna Shirley infusion, , Intravenous, Continuous  •  ondansetron HCl (ZOFRAN) injection 4 mg, 4 mg, Intravenous, Q6H PRN  •  calciTRIOL (ROCALTROL) cap 0.25 mcg, 0.25 mcg, Oral, Daily  •  levOCARNitine (CARNITOR) 1 GM/10ML solution 1,000 mg, 1,000 mg, Oral, BID  •  lac will be on [place].   9.      Constipation : continue PEG and Glycerin suppository.     03/02/19  Patient dialysed on Friday and no acute indication for dialysis today.   Echocardiogram and lexiscan MPI pending per cardiology.     03/03/19  Patient was more s 03/09/2019    CO2 28.0 03/09/2019    CREATSERUM 4.16 (H) 03/09/2019    BUN 19 (H) 03/09/2019     (H) 03/09/2019    CA 9.3 03/09/2019    ALB 3.2 (L) 03/09/2019    ALKPHO 107 03/09/2019    BILT 0.2 03/09/2019    TP 7.2 03/09/2019    AST 18 03/09/2019

## 2019-03-10 NOTE — PROGRESS NOTES
03/10/19  1030 03/10/19  1100 03/10/19  1224 03/10/19  1440   BP: (!) 166/71 (!) 171/69 151/71 115/60   Pulse: 94 91 96 102   Resp:  20     Temp:       TempSrc:       SpO2: 94% 100% 97% 99%   Weight:       Height:           Body mass index is 26.52 kg/m².

## 2019-03-10 NOTE — BH PROGRESS NOTE
[facility] Patient Status:  Observation    1935 MRN [id number]   Location [facility] 3W/SW Attending Jarett Starks MD   Hosp Day # 6 PCP Juan Grande MD     Jose Antonio Barrett is a 81year old female patie 1710   RBC  3.90  4.23  4.11   --   3.91   HGB  10.8*  11.7*  11.3*  11.3*  11.0*   HCT  36.0  38.7  37.7  37.9  35.5   MCV  92.3  91.5  91.7   --   90.8   MCH  27.7  27.7  27.5   --   28.1   MCHC  30.0*  30.2*  30.0*   --   31.0   RDW  16.4*  16.4*  16.6*

## 2019-03-11 NOTE — PROGRESS NOTES
Patient doing well refused her meds in AM     03/11/19  1044   BP: (!) 162/60   Pulse: 80   Resp:    Temp:    Time spent > 35 min.     Intake/Output Summary (Last 24 hours) at 3/11/2019 1451  Last data filed at 3/11/2019 1000  Gross per 24 hour   [place] NEPHRO-RICKI tab 0.8 mg 1 tablet Oral Daily with breakfast   docusate sodium (COLACE) cap 100 mg 100 mg Oral Daily   guaiFENesin (ROBITUSSIN) 100 MG/5ML solution 100 mg 100 mg Oral Q4H PRN   PEG 3350 (MIRALAX) powder packet 17 g 17 g Oral Daily   Senna (SEN glycerin, laxative, (GLYCERIN, ADULT,) 2 g Rectal Suppos Place 1 suppository rectally daily as needed. Ct Brain Or Head (53[address])    Result Date: 3/10/2019  CONCLUSION:  1. No acute findings. 2. Stable appearance of sellar/suprasellar mass.     Dictated b Ok to discharge if no stress test today    3/5/18  VS stable, BP was mildly elevated if still elevated increase hydralazine to 75 po TID      3/6  Noted Mobitz 1 block. Stop clonidine as it can cause heart block. My also need to stop beta blocker.    Incre

## 2019-03-11 NOTE — OPERATIVE REPORT
[facility]    PATIENT'S NAME: Kylee Millington PHYSICIAN: Priscila Elder MD   OPERATING PHYSICIAN: Mary Ann Marie MD   PATIENT ACCOUNT#:   [id number]    LOCATION:  [address] [place] [address] #:   [id number]       DATE OF BIRTH: There was no superficial medial cubital vein that was usable. We opened the biceps aponeurosis, identified the brachial arm, and brachial vein was acceptable. I had Anesthesia give 3000 units heparin IV. We ligated the brachial vein.   There was a singl

## 2019-03-11 NOTE — OCCUPATIONAL THERAPY NOTE
Patient is not progressing with occupational therapy; is likely Max A at her functional baseline; patient does not present with skilled needs at this time; will sign off at this time; if change in status occurs, please re-order therapy services.      Pita Holm

## 2019-03-11 NOTE — PHYSICAL THERAPY NOTE
Attempted to see patient for physical therapy session. Patient having HD. Patient at baseline and is going back to nursing home at D/C. Will D/C from skilled physical therapy caseload. RN aware and agreeable.

## 2019-03-11 NOTE — CM/SW NOTE
Addend 1233p:     SW spoke w/ daughter, Jacey Chow who stated that when pt was at [facility], pt's insurance paid for ambulance transport to and from HD where they used a jo ann lift to transport pt into her HD chair at [address]. FRANCHESCA explained that norman

## 2019-03-11 NOTE — BRIEF PROCEDURE NOTE
Pt refused CEEG this AM.  Attempted to be able to try again now however  per Nurse Pt in dialysis. I spoke with Dr. Kim we will try for tomorrow morning.

## 2019-03-12 ENCOUNTER — APPOINTMENT (OUTPATIENT)
Dept: CT IMAGING | Facility: HOSPITAL | Age: 84
End: 2019-03-12
Attending: Other
Payer: MEDICARE

## 2019-03-12 NOTE — OCCUPATIONAL THERAPY NOTE
OCCUPATIONAL THERAPY EVALUATION - INPATIENT     Room Number: 338/338-A  Evaluation Date: 3/12/2019  Type of Evaluation: Re-evaluation  Presenting Problem: unresponsive episode    Physician Order: IP Consult to Occupational Therapy  Reason for Therapy: ADL/ activities; ADL training; Endurance training;Patient/Family training;Patient/Family education; Compensatory technique education       OCCUPATIONAL THERAPY MEDICAL/SOCIAL HISTORY     Problem List  Principal Problem:    Unresponsive episode      Past Medical Hi limits     COORDINATION  Gross Motor: WFL   Fine Motor: WFL     ACTIVITIES OF DAILY LIVING ASSESSMENT  AM-PAC ‘6-Clicks’ Inpatient Daily Activity Short Form  How much help from another person does the patient currently need…  -   Putting on and taking off

## 2019-03-12 NOTE — PROGRESS NOTES
[facility]    Progress Note    Jose Nath Patient Status:  Observation    1935 MRN [id number]   Location [facility] 3W/SW Attending Canelo Rosales MD   Hosp Day # 6 PCP Chaparro Mota MD     Subjective: POD #1 Day

## 2019-03-12 NOTE — DIETARY NOTE
ADULT NUTRITION REASSESSMENT     Pt is at moderate nutrition risk. Pt does not meet malnutrition criteria.       RECOMMENDATIONS TO MD:  See Nutrition Intervention     NUTRITION DIAGNOSIS/PROBLEM:  Increased nutrient needs related to increased demand for h past 336 hrs:   Weight   03/12/19 0400 74.7 kg (164 lb 11.2 oz)   03/11/19 0500 76.3 kg (168 lb 4.8 oz)   03/10/19 0445 74.5 kg (164 lb 4.8 oz)   03/09/19 0500 72 kg (158 lb 12.8 oz)   03/08/19 0300 74.9 kg (165 lb 3.2 oz)   03/07/19 0615 76.9 kg (169 lb 8 5%, PO and supplement greater than 75% of needs, labs WNL, support wound healing and improved GI status    DIETITIAN FOLLOW UP: RD to follow up within 7 days   [address], LDN  Ext [phone]

## 2019-03-12 NOTE — PROGRESS NOTES
[facility] Patient Status:  Observation    1935 MRN [id number]   Location [facility] 3W/SW Attending Connor Suarez MD   Hosp Day # 6 PCP Asya Villagomez MD     Moises Fitzpatrick is a 81year old female patie

## 2019-03-12 NOTE — PROGRESS NOTES
[facility]    Progress Note    Peola Rho Patient Status:  Observation    1935 MRN [id number]   Location [facility] 3W/SW Attending Isha Merida MD   Hosp Day # 6 PCP Merlin Colmenares MD       Subjective:   Angely Esquivel injection 0.5 mg, 0.5 mg, Intravenous, Q4H PRN  •  dextrose 5 % and 0.9 % NaCl infusion, , Intravenous, Continuous  •  ondansetron HCl (ZOFRAN) injection 4 mg, 4 mg, Intravenous, Q6H PRN  •  calciTRIOL (ROCALTROL) cap 0.25 mcg, 0.25 mcg, Oral, Daily  •  le allopurinol and currently not in exacerbation. 8.       Pain.  The patient will be on Norco.   9.      Constipation : continue PEG and Glycerin suppository.     03/02/19  Patient dialysed on Friday and no acute indication for dialysis today.   Echocardiogr onec cleared by neurology and cardiology. Dialysis as per schedule.       Results:     Lab Results   Component Value Date    WBC 4.7 03/11/2019    HGB 11.0 (L) 03/11/2019    HCT 37.2 03/11/2019    .0 (L) 03/11/2019     03/11/2019    K 4.2 03/1 21:59 by Georgeana Hamman, MD  3/11/2019

## 2019-03-12 NOTE — PLAN OF CARE
DISCHARGE PLANNING    • Discharge to home or other facility with appropriate resources Progressing          CARDIOVASCULAR - ADULT    • Maintains optimal cardiac output and hemodynamic stability Progressing          PAIN - ADULT    • Verbalizes/displays ad

## 2019-03-12 NOTE — PROGRESS NOTES
[facility] Patient Status:  Observation    1935 MRN [id number]   Location [facility] 3W/SW Attending Claudell Flash, MD   Hosp Day # 6 PCP Henrietta Moody MD     Danisha Benitez is a 81year old female patie

## 2019-03-12 NOTE — PROGRESS NOTES
03/11/19  0500 03/11/19  0839 03/11/19  1044 03/11/19  1619   BP:  150/64 (!) 162/60 (!) 161/53   Pulse:  80 80 84   Resp:  20  20   Temp:    98.2 °F (36.8 °C)   TempSrc:  Oral  Oral   SpO2:  98%  100%   Weight: 168 lb 4.8 oz (76.3 kg)      Height:

## 2019-03-12 NOTE — PHYSICAL THERAPY NOTE
PHYSICAL THERAPY EVALUATION - INPATIENT     Room Number: 338/338-A  Evaluation Date: 3/12/2019  Type of Evaluation: Initial   Physician Order: PT Eval and Treat    Presenting Problem: unresponsive episode  Reason for Therapy: Mobility Dysfunction and Disc 3x/week       PHYSICAL THERAPY MEDICAL/SOCIAL HISTORY   Problem List  Principal Problem:    Unresponsive episode      Past Medical History  Past Medical History:   Diagnosis Date   • Congestive heart disease ([facility] [address]    • Diabetes ([facility] [address]    • Dialysis patient commode, etc.): Unable   -   Moving from lying on back to sitting on the side of the bed?: A Lot   How much help from another person does the patient currently need. ..   -   Moving to and from a bed to a chair (including a wheelchair)?: Total   -   Need to

## 2019-03-12 NOTE — PROGRESS NOTES
Pt seen acute encephalopathy minimally responsive  eeg just removed and missed incident  bp 140/68, bs=90  Cor nl s1s2  Lungs ant ausc occ rhonchi  Neuro moves all ext and woke up when I discussed her drs I knew at [facility]  awoke with mild sternal rub    dw

## 2019-03-12 NOTE — CM/SW NOTE
SW spoke w/ Domenico Grr, who stated pt has been at [facility] for about 6 months. When pt's medicare no longer approved pt's skilled rehab, pt became long term private pay.  Domenico Grr stated that they gave pt's daughter a medicaid application, but daughter did not [employer] paid for the ambulance to transfer pt to and from outpatient HD. With the information above, FRANCHESCA anticipates that very limited options of accepting SNF that will accept w/ outpatient HD needs.  SW will send tentative referrals for in house HD fac

## 2019-03-12 NOTE — SIGNIFICANT EVENT
RRT  Pt had been reporting pain earlier. Upon arrival with [place], patient was less talkative than earlier, her daughter Briseyda Meredith at the bedside said the pt behaves this way when she is in pain. Arlette Randhawa was not given due to patient's changed behavior. VSS.  B

## 2019-03-13 NOTE — PLAN OF CARE
CARDIOVASCULAR - ADULT    • Maintains optimal cardiac output and hemodynamic stability Progressing        PAIN - ADULT    • Verbalizes/displays adequate comfort level or patient's stated pain goal Progressing        SAFETY ADULT - FALL    • Free from fall

## 2019-03-13 NOTE — PROGRESS NOTES
Patient seen in fu. No further episodes of unresponsiveness. 03/13/19  1536   BP: 136/53   Pulse: 78   Resp: 16   Temp: 99 °F (37.2 °C)   Time spent > 25 min.     Intake/Output Summary (Last 24 hours) at 3/13/2019 9165  Last data filed at 3/13/201 NEPHRO-RICKI tab 0.8 mg 1 tablet Oral Daily with breakfast   docusate sodium (COLACE) cap 100 mg 100 mg Oral Daily   guaiFENesin (ROBITUSSIN) 100 MG/5ML solution 100 mg 100 mg Oral Q4H PRN   PEG 3350 (MIRALAX) powder packet 17 g 17 g Oral Daily   Senna (SEN glycerin, laxative, (GLYCERIN, ADULT,) 2 g Rectal Suppos Place 1 suppository rectally daily as needed. Lab Results   Component Value Date    WBC 4.7 03/13/2019    HGB 10.0 03/13/2019    HCT 32.8 03/13/2019    .0 03/13/2019    CREATSERUM 2. Small defect. May continue with medical management. May proceed with surgery as planned tomorrow. 3/8 VS stable continue same treatment   increase Imdur to 90 daily    3/9 BP stable today. Plan for surgery tomorrow.     3/10 Unresponsive episode with nor

## 2019-03-13 NOTE — PROGRESS NOTES
03/12/19  1524 03/12/19  1541 03/12/19  1712 03/12/19 1955   BP: 157/59 148/60 132/46 127/46   Pulse: 81 83 80    Resp: 16 16 16 15   Temp:   98.8 °F (37.1 °C) 98.7 °F (37.1 °C)   TempSrc:   Oral Oral   SpO2: 98% 95% 96% 97%   Weight:       Height:

## 2019-03-13 NOTE — PROGRESS NOTES
[facility]    Progress Note    Sabine Christian Patient Status:  Observation    1935 MRN [id number]   Location [facility] 3W/SW Attending Dorinda Monson MD   Hosp Day # 6 PCP Magalie Greenberg MD       Subjective:   Collin Moser injection 0.5 mg, 0.5 mg, Intravenous, Q4H PRN  •  dextrose 5 % and 0.9 % NaCl infusion, , Intravenous, Continuous  •  ondansetron HCl (ZOFRAN) injection 4 mg, 4 mg, Intravenous, Q6H PRN  •  calciTRIOL (ROCALTROL) cap 0.25 mcg, 0.25 mcg, Oral, Daily  •  le allopurinol and currently not in exacerbation. 8.       Pain.  The patient will be on Norco.   9.      Constipation : continue PEG and Glycerin suppository.     03/02/19  Patient dialysed on Friday and no acute indication for dialysis today.   Echocardiogr onec cleared by neurology and cardiology. Dialysis as per schedule.        03/12/19  Patient had 2 episodes of unresponsiveness and had associated borderline low blood glucose. Started on D10 at 30 ml/hr.  Patient feeding poorly and will start her on [place]

## 2019-03-13 NOTE — PROGRESS NOTES
[facility]    Progress Note    Maite Kimball Patient Status:  Observation    1935 MRN [id number]   Location [facility] 3W/SW Attending Vance Wynn MD   Hosp Day # 6 PCP Dawit Haney MD       Subjective:   Indigo Ferguson Units, Oral, Daily  •  HYDROmorphone HCl (DILAUDID) 1 MG/ML injection 0.5 mg, 0.5 mg, Intravenous, Q4H PRN  •  dextrose 5 % and 0.9 % NaCl infusion, , Intravenous, Continuous  •  ondansetron HCl (ZOFRAN) injection 4 mg, 4 mg, Intravenous, Q6H PRN  •  calci and filling pattern). 7.      Gout : Patient to continue on allopurinol and currently not in exacerbation.   8.       Pain.  The patient will be on Norco.   9.      Constipation : continue PEG and Glycerin suppository.     03/02/19  Patient dialysed on Fri per neurology. Depakote instead of Keppra. Will discharge onec cleared by neurology and cardiology. Dialysis as per schedule.       03/12/19  Patient had 2 episodes of unresponsiveness and had associated borderline low blood glucose.   Started on D10 at 3

## 2019-03-13 NOTE — PROGRESS NOTES
Patient seen in fu. Seen earlier in the day. Events since then reviewed. 03/12/19  1712   BP: 132/46   Pulse: 80   Resp: 16   Temp: 98.8 °F (37.1 °C)   Time spent > 25 min.     Intake/Output Summary (Last 24 hours) at 3/12/2019 1954  Last data tarah atorvastatin (LIPITOR) tab 80 mg 80 mg Oral Nightly   NEPHRO-RICKI tab 0.8 mg 1 tablet Oral Daily with breakfast   docusate sodium (COLACE) cap 100 mg 100 mg Oral Daily   guaiFENesin (ROBITUSSIN) 100 MG/5ML solution 100 mg 100 mg Oral Q4H PRN   PEG 3350 (MI glycerin, laxative, (GLYCERIN, ADULT,) 2 g Rectal Suppos Place 1 suppository rectally daily as needed.                  1 Syncope with trop leak in HD patient , CABG 2011 (LIMA to LAD, SVG to OM1, SVG to RCA),  EKG non ischemic: Recent NSTEMI, trop peak at 3/11 patient refused her meds doing well    3/12 refused EEG. Unresponsive episode later in day. Starting seizure treatment.

## 2019-03-13 NOTE — CM/SW NOTE
FRANCHESCA informed by Zondra Boxer from [phone] [facility] that can accept the pt pending insurance auth. FRANCHESCA spoke with the pt's dtr Sharmaine Vargas [phone] informed her of above stated information.  Sharmaine Vargas stated that she was not ready to decide on a facility and she

## 2019-03-14 NOTE — CM/SW NOTE
Addendum 1:49pm    Sw spoke with the pt's daughter Brenna who stated that she is going to tour the [place] facility and let me know if she wants the pt to go there.  FRANCHESCA explained that insurance authorization needs to be pursued as the pt is medically sta

## 2019-03-14 NOTE — PLAN OF CARE
Problem: Patient Centered Care  Goal: Patient preferences are identified and integrated in the patient's plan of care  Interventions:  - What would you like us to know as we care for you?  I am close with my family   - Provide timely, complete, and accurate Assess and document dressing/incision, wound bed, drain sites and surrounding tissue  - Implement wound care per orders  - Initiate isolation precautions as appropriate  - Initiate Pressure Ulcer prevention bundle as indicated  Outcome: Progressing  Pt tur patient/family/discharge partner in discharge planning  - Arrange for needed discharge resources and transportation as appropriate  - Identify discharge learning needs (meds, wound care, etc)  - Arrange for interpreters to assist at discharge as needed  -

## 2019-03-14 NOTE — PROGRESS NOTES
[facility] Patient Status:  Observation    1935 MRN [id number]   Location [facility] 3W/SW Attending Felix Almonte MD   Hosp Day # 6 PCP Ebony Reyes MD     Merry Clemente is a 81year old female patie

## 2019-03-14 NOTE — PROGRESS NOTES
Patient seen in fu. No new events. 03/14/19  0936   BP: 138/52   Pulse: 71   Resp: 18   Temp: 98.3 °F (36.8 °C)   Time spent > 25 min.     Intake/Output Summary (Last 24 hours) at 3/14/2019 1126  Last data filed at 3/14/2019 0936  Gross per 24 roberto NEPHRO-RICKI tab 0.8 mg 1 tablet Oral Daily with breakfast   docusate sodium (COLACE) cap 100 mg 100 mg Oral Daily   guaiFENesin (ROBITUSSIN) 100 MG/5ML solution 100 mg 100 mg Oral Q4H PRN   PEG 3350 (MIRALAX) powder packet 17 g 17 g Oral Daily   Senna (SEN glycerin, laxative, (GLYCERIN, ADULT,) 2 g Rectal Suppos Place 1 suppository rectally daily as needed. Lab Results   Component Value Date    WBC 4.7 03/13/2019    HGB 10.0 03/13/2019    HCT 32.8 03/13/2019    .0 03/13/2019    CREATSERUM 2. Small defect. May continue with medical management. May proceed with surgery as planned tomorrow. 3/8 VS stable continue same treatment   increase Imdur to 90 daily    3/9 BP stable today. Plan for surgery tomorrow.     3/10 Unresponsive episode with nor

## 2019-03-14 NOTE — PROGRESS NOTES
Patient feeling okay today. Appetite is good. She did not sit in a chair today. No headache or dizziness.      /41 (BP Location: Right arm)   Pulse 71   Temp 98.4 °F (36.9 °C) (Oral)   Resp 18   Ht 5' 6\" (1.676 m)   Wt 166 lb (75.3 kg)   SpO2 97%

## 2019-03-14 NOTE — PROGRESS NOTES
03/13/19  0408 03/13/19  0838 03/13/19  1536 03/13/19 2047   BP:  120/50 136/53 152/55   Pulse:  85 78 78   Resp:  16 16 16   Temp:  98.3 °F (36.8 °C) 99 °F (37.2 °C) 99 °F (37.2 °C)   TempSrc:  Oral Oral Oral   SpO2:  97% 97% 96%   Weight: 169 lb 12.8 o

## 2019-03-15 NOTE — PROGRESS NOTES
VASCULAR ACCESS: PIV attempted today several times and unsuccessful; Patient screaming when punctured by the tip of IV catheter; Verbalizing she doesn't want to be stuck anymore today. Phlebotomy is able to draw blood.  Daughter at the bedside and decided t

## 2019-03-15 NOTE — PROGRESS NOTES
[facility]    Progress Note    Cathi Salomon Patient Status:  Observation    1935 MRN [id number]   Location [facility] 3W/SW Attending Kristen Mccoy MD   Hosp Day # 6 PCP Kevin Renner MD       Subjective:   Dell Smith Intravenous, Q4H PRN  •  dextrose 5 % and 0.9 % NaCl infusion, , Intravenous, Continuous  •  ondansetron HCl (ZOFRAN) injection 4 mg, 4 mg, Intravenous, Q6H PRN  •  calciTRIOL (ROCALTROL) cap 0.25 mcg, 0.25 mcg, Oral, Daily  •  levOCARNitine (CARNITOR) 1 G not in exacerbation. 8.       Pain.  The patient will be on Norco.   9.      Constipation : continue PEG and Glycerin suppository.     03/02/19  Patient dialysed on Friday and no acute indication for dialysis today.   Echocardiogram and lexiscan MPI pendin and cardiology. Dialysis as per schedule.        03/12/19  Patient had 2 episodes of unresponsiveness and had associated borderline low blood glucose. Started on D10 at 30 ml/hr. Patient feeding poorly and will start her on megace if no improvement.   Per

## 2019-03-15 NOTE — CM/SW NOTE
Addendum 8:46am    The following facilities are unable to accept the pt:    [place] [address] [facility] [place] [address]  [address]      8:34am    SW spoke pt's daughter Howard Whitman [phone]

## 2019-03-15 NOTE — CM/SW NOTE
Case Management /Progression of Care    Multiple attempt to contact patients daughter Brant Vizcaino [phone]  for discharge planning,   voice Mail is full, unable to leave a message.    The following facilities are unable to accept patient  [facility] FELDER

## 2019-03-15 NOTE — PROGRESS NOTES
Patient seen in fu. No further episodes of unresponsiveness. 03/15/19  0824   BP: (!) 166/61   Pulse: 76   Resp: 16   Temp: 98.2 °F (36.8 °C)   Time spent > 25 min.     Intake/Output Summary (Last 24 hours) at 3/15/2019 1516  Last data filed at 3/ NEPHRO-RICKI tab 0.8 mg 1 tablet Oral Daily with breakfast   docusate sodium (COLACE) cap 100 mg 100 mg Oral Daily   guaiFENesin (ROBITUSSIN) 100 MG/5ML solution 100 mg 100 mg Oral Q4H PRN   PEG 3350 (MIRALAX) powder packet 17 g 17 g Oral Daily   Senna (SEN glycerin, laxative, (GLYCERIN, ADULT,) 2 g Rectal Suppos Place 1 suppository rectally daily as needed.            Lab Results   Component Value Date    TROP 0.125 03/14/2019        1 Syncope with trop leak in HD patient , CABG 2011 (LIMA to LAD, SVG to OM1, 3/10 Unresponsive episode with normal vitals. Appears to be a seizure episode. EEG planned. EKG similar except for tachycardia. 3/11 patient refused her meds doing well    3/12 refused EEG. Unresponsive episode later in day.  Starting seizure treatment

## 2019-03-15 NOTE — PROGRESS NOTES
[facility] Patient Status:  Observation    1935 MRN [id number]   Location [facility] 3W/SW Attending Santiago Vallejo MD   Hosp Day # 6 PCP Talat Martinez MD     Nika Morton is a 81year old female patie Assessment & Plan     ESRD   `~~~~~~~~~~~~~~~ M

## 2019-03-16 NOTE — PROGRESS NOTES
[facility]    Progress Note    Bin Charmaine Patient Status:  Observation    1935 MRN [id number]   Location [facility] 3W/SW Attending Butch Doan MD   Hosp Day # 6 PCP Shaneka Lopez MD       Subjective:   Dev Baker vitamin E cap, 100 Units, Oral, Daily  •  HYDROmorphone HCl (DILAUDID) 1 MG/ML injection 0.5 mg, 0.5 mg, Intravenous, Q4H PRN  •  dextrose 5 % and 0.9 % NaCl infusion, , Intravenous, Continuous  •  ondansetron HCl (ZOFRAN) injection 4 mg, 4 mg, Intravenous abnormal relaxation and filling pattern). 7.      Gout : Patient to continue on allopurinol and currently not in exacerbation.   8.       Pain.  The patient will be on Norco.   9.      Constipation : continue PEG and Glycerin suppository.     03/02/19  Pedro Londono Titrating seizure meds per neurology. Depakote instead of Keppra. Will discharge onec cleared by neurology and cardiology.   Dialysis as per schedule.        03/12/19  Patient had 2 episodes of unresponsiveness and had associated borderline low blood gluco 3/14/2019  ECG Report  Interpretation  -------------------------- Sinus Rhythm - Negative T-waves - Lateral ischemia. ABNORMAL When compared with ECG of 03/10/2019 15:02:04 HR has decreased Electronically signed on 03/15/2019 at 16:44 by GUZMAN Etienne

## 2019-03-16 NOTE — PLAN OF CARE
Problem: Patient Centered Care  Goal: Patient preferences are identified and integrated in the patient's plan of care  Interventions:  - What would you like us to know as we care for you?  I am close with my family   - Provide timely, complete, and accurate integrity  - Assess and document dressing/incision, wound bed, drain sites and surrounding tissue  - Implement wound care per orders  - Initiate isolation precautions as appropriate  - Initiate Pressure Ulcer prevention bundle as indicated  Outcome: Prem discharge learning needs (meds, wound care, etc)  - Arrange for interpreters to assist at discharge as needed  - Consider post-discharge preferences of patient/family/discharge partner  - Complete POLST form as appropriate  - Assess patient's ability to be

## 2019-03-16 NOTE — PROGRESS NOTES
Patient seen in fu. No further episodes of unresponsiveness. 03/16/19  0828   BP: (!) 168/65   Pulse: 72   Resp: 18   Temp: 98.3 °F (36.8 °C)   Time spent > 25 min.     Intake/Output Summary (Last 24 hours) at 3/16/2019 1216  Last data filed at 3/ atorvastatin (LIPITOR) tab 80 mg 80 mg Oral Nightly   NEPHRO-RICKI tab 0.8 mg 1 tablet Oral Daily with breakfast   docusate sodium (COLACE) cap 100 mg 100 mg Oral Daily   guaiFENesin (ROBITUSSIN) 100 MG/5ML solution 100 mg 100 mg Oral Q4H PRN   PEG 3350 (MI glycerin, laxative, (GLYCERIN, ADULT,) 2 g Rectal Suppos Place 1 suppository rectally daily as needed.            Lab Results   Component Value Date    TROP 0.102 03/15/2019        1 Syncope with trop leak in HD patient , CABG 2011 (LIMA to LAD, SVG to OM1, 3/10 Unresponsive episode with normal vitals. Appears to be a seizure episode. EEG planned. EKG similar except for tachycardia. 3/11 patient refused her meds doing well    3/12 refused EEG. Unresponsive episode later in day.  Starting seizure treatment

## 2019-03-16 NOTE — PROGRESS NOTES
[facility]    Endocrine Progress Note  [facility]    Nika Morton Patient Status:  Observation    1935 MRN [id number]   Location [facility] 3W/SW Attending Santiago Vallejo, [address] Day # 6 PCP Huma Cid [facility]  •  vitamin E cap, 100 Units, Oral, Daily  •  HYDROmorphone HCl (DILAUDID) 1 MG/ML injection 0.5 mg, 0.5 mg, Intravenous, Q4H PRN  •  dextrose 5 % and 0.9 % NaCl infusion, , Intravenous, Continuous  •  ondansetron HCl (ZOFRAN) injection 4 mg, 4 mg, Int symmetric  Neurologic: Alert, follows commands, answers appropriately  Results:     Lab Results   Component Value Date    WBC 4.7 03/13/2019    HGB 10.0 (L) 03/13/2019    HCT 32.8 (L) 03/13/2019    .0 03/13/2019    CREATSERUM 2.74 (H) 03/13/2019

## 2019-03-16 NOTE — CONSULTS
[facility]    Report of Endocrinology Consultation  [facility]    Mariajose Ford Patient Status:  Observation    1935 MRN [id number]   Location [facility] 3W/SW Attending Cindy Bates MD   [address] Day # 6 Allergies    Medications:    Current Facility-Administered Medications:   •  [START ON 3/16/2019] Isosorbide Mononitrate ER (IMDUR) 24 hr tab 90 mg, 90 mg, Oral, Daily  •  Albumin Human (ALBUMINAR) 5 % solution 250 mL, 250 mL, Intravenous, PRN Dialysis  • mg, Oral, QAM AC  [unfilled]    ROS:  Constitutional: negative  Eyes: negative  Ears, nose, mouth, throat, and face: negative  Respiratory: negative  Cardiovascular: As in history of present  Gastrointestinal: negative  Genitourinary:negative  Integument/br serum glucose. Will obtain serum glucose measurement when capillary glucose is below 65. Staring episode would not be typically a presenting symptoms of hypoglycemia.   Patient has known pituitary mass and subtle central (secondary) hypoadrenalism could c

## 2019-03-16 NOTE — PROGRESS NOTES
[facility]    Progress Note    Lesia Alvarez Patient Status:  Observation    1935 MRN [id number]   Location [facility] 3W/SW Attending Franchesca Herman MD   Hosp Day # 6 PCP Kaylynn Todd MD       Subjective:   [facility]  •  vitamin E cap, 100 Units, Oral, Daily  •  HYDROmorphone HCl (DILAUDID) 1 MG/ML injection 0.5 mg, 0.5 mg, Intravenous, Q4H PRN  •  dextrose 5 % and 0.9 % NaCl infusion, , Intravenous, Continuous  •  ondansetron HCl (ZOFRAN) injection 4 mg, 4 mg, Int pattern ( abnormal relaxation and filling pattern). 7.      Gout : Patient to continue on allopurinol and currently not in exacerbation.   8.       Pain.  The patient will be on Norco.   9.      Constipation : continue PEG and Glycerin suppository.     03/ stable. Titrating seizure meds per neurology. Depakote instead of Keppra. Will discharge onec cleared by neurology and cardiology.   Dialysis as per schedule.        03/12/19  Patient had 2 episodes of unresponsiveness and had associated borderline low blo 03/10/2019 15:02:04 HR has decreased Electronically signed on 03/15/2019 at 16:44 by Petty Aguilar MD  3/15/2019

## 2019-03-17 NOTE — PROGRESS NOTES
[facility]    Endocrine Progress Note  [facility]    Bharat Irving Patient Status:  Observation    1935 MRN [id number]   Location [facility] 3W/SW Attending Fadi Landa, [address] [facility] Day # 6 PCP Sai Smith consultation        Subjective:    No new complaints    Medications:    Current Facility-Administered Medications:   •  [START ON 3/18/2019] calciTRIOL (ROCALTROL) cap 0.5 mcg, 0.5 mcg, Oral, Daily  •  Cinacalcet HCl (SENSIPAR) tab 30 mg, 30 mg, Oral, Crystal UNIT/ML injection 5,000 Units, 5,000 Units, Subcutaneous, Q8H [address]  •  Pantoprazole Sodium (PROTONIX) EC tab 20 mg, 20 mg, Oral, QAM AC    Objective:   Blood pressure 139/57, pulse 72, temperature 98.8 °F (37.1 °C), temperature source Oral, resp.  rate 18, h

## 2019-03-17 NOTE — PLAN OF CARE
DISCHARGE PLANNING    • Discharge to home or other facility with appropriate resources Progressing        PAIN - ADULT    • Verbalizes/displays adequate comfort level or patient's stated pain goal Progressing        SKIN/TISSUE INTEGRITY - ADULT    • Incis

## 2019-03-17 NOTE — PLAN OF CARE
Pt doing well, right heel in boot and elevated, Pt turned q2hrs due to buttock pain and pressure ulcer prevention. [place] for left arm surgical pain. Appetite good.  Refused blood draw ordered from Nephrology , Pt wants blood to be drawn with Hemodialysis
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
## 2019-03-17 NOTE — PROGRESS NOTES
[facility] Patient Status:  Observation    1935 MRN [id number]   Location [facility] 3W/SW Attending Milka Green MD   Hosp Day # 6 PCP Delila Goodpasture, MD Arman Sumner is a 81year old female patie Latest Ref Range: 3.5 - 5.1 mmol/L 4.2 3.6      Chloride Latest Ref Range: 98 - 107 mmol/L 108 (H) 104      Carbon Dioxide, Total Latest Ref Range: 21.0 - 32.0 mmol/L 26.0 33.0 (H)      BUN Latest Ref Range: 7 - 18 mg/dL 26 (H) 13      CREATININE Latest Re (L) 10.0 (L)   Hematocrit Latest Ref Range: 35.0 - 48.0 % 37.7 37.9 35.5 37.2 32.8 (L)   Platelet Count Latest Ref Range: 150.0 - 450.0 10(3)uL 100.0 (L)  165.0 146.0 (L) 152.0   RBC Latest Ref Range: 3.80 - 5.30 x10(6)uL 4.11  3.91 4.00 3.58 (L)   [facility] Lat

## 2019-03-17 NOTE — PROGRESS NOTES
[facility]    Progress Note    Martha Wynnnan Patient Status:  Observation    1935 MRN [id number]   Location [facility] 3W/SW Attending Peter Dawkins MD   Hosp Day # 6 PCP Elizabeth Bernal MD       Subjective:   Simón Singleton [facility]) injection 4 mg, 4 mg, Intravenous, Q6H PRN  •  acetaminophen (TYLENOL) tab 650 mg, 650 mg, Oral, Q6H PRN  •  vitamin E cap, 100 Units, Oral, Daily  •  HYDROmorphone HCl (DILAUDID) 1 MG/ML injection 0.5 mg, 0.5 mg, Intravenous, Q4H PRN  •  dextrose atorvastatin. 6.      CHF: Preserved EF and HTN pattern ( abnormal relaxation and filling pattern). 7.      Gout : Patient to continue on allopurinol and currently not in exacerbation.   8.       Pain.  The patient will be on Norco.   9.      Constipation on blood work.         03/11/19  Patient seems stable. Titrating seizure meds per neurology. Depakote instead of Keppra. Will discharge onec cleared by neurology and cardiology.   Dialysis as per schedule.        03/12/19  Patient had 2 episodes of unrespo 1.1 03/14/2019    TSH 0.398 03/15/2019    MG 2.6 03/11/2019    PHOS 3.5 03/11/2019    TROP 0.102 (HH) 03/15/2019    B12 1,850 (H) 03/04/2019     No results found for this visit on 03/01/19.                       Gee Lopez MD  3/17/2019

## 2019-03-18 NOTE — PROGRESS NOTES
Patient seen in fu. No further episodes of unresponsiveness. 03/18/19  1157   BP: 148/55   Pulse:    Resp: 18   Temp: 98.5 °F (36.9 °C)   Time spent > 25 min.     Intake/Output Summary (Last 24 hours) at 3/18/2019 1344  Last data filed at 3/18/201 HYDROcodone-acetaminophen (NORCO) 5-325 MG per tab 1 tablet 1 tablet Oral Q4H PRN   metoprolol Tartrate (LOPRESSOR) tab 12.5 mg 12.5 mg Oral 2x Daily(Beta Blocker)   amLODIPine Besylate (NORVASC) tab 10 mg 10 mg Oral Daily   aspirin chewable tab 81 mg 81 m TraMADol HCl 50 MG Oral Tab Take 50 mg by mouth every 6 (six) hours as needed for Pain.   guaiFENesin 100 MG/5ML Oral Solution Take 100 mg by mouth every 4 (four) hours as needed. PEG 3350 Oral Powd Pack Take 17 g by mouth daily.    Senna 8.8 MG/5ML Oral 3/4/18 patient BP better controlled, echo normal EF , daughter not sure if she want stress test or no if she does not want it she can have it as outpatient when she sees her   Ok to discharge if no stress test today    3/5/18  VS stable, BP was mildly elyse

## 2019-03-18 NOTE — PLAN OF CARE
Problem: Patient Centered Care  Goal: Patient preferences are identified and integrated in the patient's plan of care  Interventions:  - What would you like us to know as we care for you?  I am close with my family   - Provide timely, complete, and accurate development  - Assess and document skin integrity  - Assess and document dressing/incision, wound bed, drain sites and surrounding tissue  - Implement wound care per orders  - Initiate isolation precautions as appropriate  - Initiate Pressure Ulcer prevent prec in place.     Problem: DISCHARGE PLANNING  Goal: Discharge to home or other facility with appropriate resources  INTERVENTIONS:  - Identify barriers to discharge w/pt and caregiver  - Include patient/family/discharge partner in discharge planning  - Ar

## 2019-03-18 NOTE — PROGRESS NOTES
[facility] Patient Status:  Observation    1935 MRN [id number]   Location [facility] 3W/SW Attending Dorinda Monson MD   Hosp Day # 6 PCP MD Bartolome Waltersramses Christian is a 81year old female patie

## 2019-03-18 NOTE — CM/SW NOTE
Addendum 3:50pm    SW followed up with David Galeas from [address] who stated that they are still waiting to hear back from [employer]. Addendum 12:33pm    SW sent clinicals via [employer].  Social work/case management to remain available for support and/o

## 2019-03-18 NOTE — PLAN OF CARE
Called pt's daughter, Isaias Vela, informed her about new order of zofran odt prn for n/v and she said it's ok to give this med to her mom.

## 2019-03-18 NOTE — PLAN OF CARE
PAIN - ADULT    • Verbalizes/displays adequate comfort level or patient's stated pain goal Not Progressing        norco given q4hrs. Continues to c/o pain in buttocks and left arm. Turning q2hrs. L arm elevated on pillow.

## 2019-03-19 NOTE — PROGRESS NOTES
[facility] Patient Status:  Observation    1935 MRN [id number]   Location [facility] 3W/SW Attending Jarett Starks MD   Hosp Day # 6 PCP Juan Grande MD     Jose Antonio Arnold is a 81year old female patie

## 2019-03-19 NOTE — PLAN OF CARE
PAIN - ADULT    • Verbalizes/displays adequate comfort level or patient's stated pain goal Not Progressing          CARDIOVASCULAR - ADULT    • Maintains optimal cardiac output and hemodynamic stability Progressing        DISCHARGE PLANNING    • Discharge

## 2019-03-19 NOTE — PROGRESS NOTES
03/18/19 2015 03/19/19  0300 03/19/19  0526 03/19/19  0903   BP: 104/47  128/52 136/45   Pulse: 67  70 71   Resp: 18 18 18   Temp: 98.4 °F (36.9 °C)  98.3 °F (36.8 °C) 98.3 °F (36.8 °C)   TempSrc: Oral  Oral Oral   SpO2: 95%  94% 92%   Weight:  154 lb 4

## 2019-03-19 NOTE — PROGRESS NOTES
[facility]    Progress Note    Jose Motanju Patient Status:  Observation    1935 MRN [id number]   Location [facility] 3W/SW Attending Canelo Rosales MD   Hosp Day # 6 PCP Chaparro Mota MD       Subjective:   Jena Butler Daily  •  Cinacalcet HCl (SENSIPAR) tab 30 mg, 30 mg, Oral, Daily with breakfast  •  hydrALAZINE HCl (APRESOLINE) tab 100 mg, 100 mg, Oral, Q8H [place]  •  Isosorbide Mononitrate ER (IMDUR) 24 hr tab 90 mg, 90 mg, Oral, Daily  •  Albumin Human (ALBUMINAR) 5 % 1.       Syncope: Patient evaluated by Neurology and unlikely to be seizure. Possibly lethargy or near syncope. CUS                          pending and telemetry to continue.   2.      Diabetes: Patient will get accu check and sliding scale.   3.       home.  Dialysis done today.     03/09/19  Patient planned for AVF creation tomorrow. Treat constipation as no BM and will give enema if needed. Overall much improved.      03/10/19  Patient today became unresponsive and had twitching.  Post AVF creation o patient's daughter does not like depakote and thinks the episodes were not seizures. Plan for discharge to [facility] tomorrow.     Results:     Lab Results   Component Value Date    WBC 6.0 03/18/2019    HGB 9.2 (L) 03/18/2019    HCT 30.5 (L) 03/18/

## 2019-03-19 NOTE — PROGRESS NOTES
Patient seen in fu. No further episodes of unresponsiveness. 03/19/19  0903   BP: 136/45   Pulse: 71   Resp: 18   Temp: 98.3 °F (36.8 °C)   Time spent > 25 min.     Intake/Output Summary (Last 24 hours) at 3/19/2019 1149  Last data filed at 3/19/2 lactulose (CHRONULAC) 10 GM/15ML solution 20 g 20 g Oral TID   oxandrolone (OXANDRIN) tab 5 mg 5 mg Oral BID   HYDROcodone-acetaminophen (NORCO) 5-325 MG per tab 1 tablet 1 tablet Oral Q4H PRN   metoprolol Tartrate (LOPRESSOR) tab 12.5 mg 12.5 mg Oral 2x D HYDROcodone-acetaminophen 5-325 MG Oral Tab Take 1 tablet by mouth every 6 (six) hours as needed for Pain.    TraMADol HCl 50 MG Oral Tab Take 50 mg by mouth every 6 (six) hours as needed for Pain.   guaiFENesin 100 MG/5ML Oral Solution Take 100 mg by mouth Abnormal stress test. Small apical defect. Had scheduled for cardiac cath today. Daughter and patient declined at this time. Small defect. May continue with medical management. May proceed with surgery as planned tomorrow.     3/8 VS stable continue same

## 2019-03-19 NOTE — CM/SW NOTE
Addendum 2:11pm    FRANCHESCA spoke with [place] [facility] from [employer] [phone] who stated that they are still waiting on the insurance company.  FRANCHESCA spoke with pt's daughter Kailyn German [phone] and explained that if the Joey is not unable to obtain insurance auth the

## 2019-03-19 NOTE — PROGRESS NOTES
03/18/19  0700 03/18/19  1157 03/18/19  1800 03/18/19 2015   BP:  148/55 (!) 86/35 104/47   Pulse:    67   Resp:  18  18   Temp:  98.5 °F (36.9 °C)  98.4 °F (36.9 °C)   TempSrc:  Oral  Oral   SpO2:  96%  95%   Weight: 163 lb 9.6 oz (74.2 kg)      Height:

## 2019-03-20 NOTE — PROGRESS NOTES
Held Cardiac meds this AM d/t dialysis. Patient is refusing dialysis at this time, this RN tried to take her B/P again and pass morning cardiac meds, however patient refused. Paged Dr. Swann Earing to make him aware.   Waiting for daughters arrival.

## 2019-03-20 NOTE — DIETARY NOTE
ADULT NUTRITION REASSESSMENT     Pt is at moderate nutrition risk. Pt does not meet malnutrition criteria.       RECOMMENDATIONS TO MD:  See Nutrition Intervention     NUTRITION DIAGNOSIS/PROBLEM:  Increased nutrient needs related to increased demand for h overweight  IBW: 122 lbs(adjusted for BKA        Now 127% of adjusted IBW  Usual Body Wt: patient unsure. Appears within weight history.   WEIGHT HISTORY:  Patient Weight(s) for the past 336 hrs:   Weight   03/20/19 0409 70.6 kg (155 lb 9.6 oz)   03/19/19 Nutrition Goals:      maintain wt within 5%, PO and supplement greater than 75% of needs, labs WNL, support wound healing and improved GI status    DIETITIAN FOLLOW UP: RD to follow up within 7 days   [address], LDN  Ext [phone]

## 2019-03-20 NOTE — PROGRESS NOTES
[facility]    Progress Note    Vianney Crawford Patient Status:  Observation    1935 MRN [id number]   Location [facility] 3W/SW Attending Shira Monson MD   Hosp Day # 6 PCP Ebeenzer Manning MD       Subjective:   Clive Chi 100 mg, 100 mg, Oral, Q8H [place]  •  Isosorbide Mononitrate ER (IMDUR) 24 hr tab 90 mg, 90 mg, Oral, Daily  •  Albumin Human (ALBUMINAR) 5 % solution 250 mL, 250 mL, Intravenous, PRN Dialysis  •  dextrose 10 % infusion, , Intravenous, Continuous  •  ondansetr will get accu check and sliding scale. 3.       Hypertension.  The patient will be on antihypertensive medications.    4.       Anemia.  This is secondary to chronic kidney disease, and the patient will be on oral iron.    5.       Hyperlipidemia. Johanna ragland unresponsive and had twitching. Post AVF creation on left arm  Refusing labs. But has improved significantly since the episode. Per neurology will plan EEG if needed. Keppra being ordered per neurology. Holding discharge.   Next dialysis tomorrow or today secondary to post op inflammation, No perfusion issues of the hand. Today again resent the request for placement and d/w  and daughter, the form for appeal has been in chart since past 3 days. Continue supportive care and dialysis.     Result

## 2019-03-20 NOTE — PROGRESS NOTES
Below note from [facility]. Patient was interviewed and examined. Agree with assessment and plan with edits to the document performed as necessary.     [facility] AND CLINICS  Progress Note    Secundino Ahumada Patient Status:  Observation    1935 MRN [id number] : 160 lb (72.6 kg)  11/13/18 1334 : 156 lb (70.8 kg)      Allergies:  No Known Allergies    Physical Exam:  Blood pressure 123/45, pulse 64, temperature 98.2 °F (36.8 °C), temperature source Oral, resp.  rate 18, height 167.6 cm (5' 6\"), weight 155 lb M20) CR tab 20 mEq 20 mEq Oral Daily   hydrOXYzine HCl (ATARAX) tab 25 mg 25 mg Oral TID PRN   iron sucrose (VENOFER) IV Push 200 mg 200 mg Intravenous Once   epoetin lul-epbx (RETACRIT) 03115 UNIT/ML injection SOLN 10,000 Units 10,000 Units Subcutaneous EC tab 20 mg 20 mg Oral QAM AC       Assessment and Plan:    1 Syncope with trop leak in HD patient , CABG 2011 (LIMA to LAD, SVG to OM1, SVG to RCA),  EKG non ischemic: Recent NSTEMI, trop peak at 0.8 at [facility] 6/18, patient was treated medically due to a

## 2019-03-20 NOTE — PROGRESS NOTES
[facility]    Progress Note    Maldonado Corbin Patient Status:  Observation    1935 MRN [id number]   Location [facility] 3W/SW Attending Bernard Villalta MD   Hosp Day # 6 PCP Debbie Randall MD     Subjective: POD #9 Days decrease inflammation at wound and control pain every four hours for 30 minutes. No hand ischemia. AVF patent. 20 total minutes spent with patient >50% of visit was spent in counseling and coordination of care.     Talat Martinez  3/19/2019

## 2019-03-20 NOTE — PROGRESS NOTES
Patient is refusing dialysis and wound tracing, she stated \"you are not supposed to be messing with sick people\" and became angry. Update messages left for Dr. Bandar Morris and Dr. Dougie Moscoso.   The daughter Thersa Carson called for update, this RN informed her

## 2019-03-20 NOTE — CM/SW NOTE
Addendum 9:10am    SW sent additional referrals to the following facilities with in house HD:    [facility] [phone]  [phone]  [facility] [phone]  [facility] [phone]

## 2019-03-20 NOTE — WOUND PROGRESS NOTE
Attempted to see patient. Attempted to remove dressings. Due to pain patient refused services and stated \"Put that sheet on these feet and get out of my room. \" RN left room. Endorsed need to trace wounds to bedside YANELIS Alberto.

## 2019-03-21 NOTE — CM/SW NOTE
SW informed by Lyle Bull from [facility] that [facility] denied the appeal and are therefore unable to accept the pt. SW attempting to find an accepting facility from the numerous referrals sent.  The following facilities are unable to accept:    [facility]

## 2019-03-21 NOTE — PROGRESS NOTES
[facility] AND CLINICS  Progress Note    Moises Fitzpatrick Patient Status:  Observation    1935 MRN [id number]   Location [facility] 3W/SW Attending Connor Suarez MD   Hosp Day # 6 PCP Asya Villagomez MD     Subjective:  Patient seen in follow 167.6 cm (5' 6\"), weight 159 lb (72.1 kg), SpO2 99 %, not currently breastfeeding. General: Alert and oriented in no apparent distress. HEENT: No focal deficits. Neck: No JVD, carotids 2+ no bruits.   Cardiac: Regular rate and rhythm, 1/6 murmur RUSB  L (CARNITOR) 1 GM/10ML solution 1,000 mg 1,000 mg Oral BID   lactulose (CHRONULAC) 10 GM/15ML solution 20 g 20 g Oral TID   oxandrolone (OXANDRIN) tab 5 mg 5 mg Oral BID   HYDROcodone-acetaminophen (NORCO) 5-325 MG per tab 1 tablet 1 tablet Oral Q4H PRN   me

## 2019-03-21 NOTE — PROGRESS NOTES
[facility]    Progress Note    Glenis Plata Patient Status:  Observation    1935 MRN [id number]   Location [facility] 3W/SW Attending Portia Libman, MD   Hosp Day # 6 PCP Sin Murray MD        Subjective:     Refuse

## 2019-03-21 NOTE — PROGRESS NOTES
[facility]    Progress Note    Martha Wynnnan Patient Status:  Observation    1935 MRN [id number]   Location [facility] 3W/SW Attending Peter Dawkins MD   Hosp Day # 6 PCP Elizabeth Bernal MD       Subjective:   Simón Singleton hydrALAZINE HCl (APRESOLINE) tab 100 mg, 100 mg, Oral, Q8H [place]  •  Isosorbide Mononitrate ER (IMDUR) 24 hr tab 90 mg, 90 mg, Oral, Daily  •  Albumin Human (ALBUMINAR) 5 % solution 250 mL, 250 mL, Intravenous, PRN Dialysis  •  dextrose 10 % infusion, , Intr continue.   2.      Diabetes: Patient will get accu check and sliding scale. 3.       Hypertension.  The patient will be on antihypertensive medications.    4.       Anemia.  This is secondary to chronic kidney disease, and the patient will be on oral iron improved.      03/10/19  Patient today became unresponsive and had twitching. Post AVF creation on left arm  Refusing labs. But has improved significantly since the episode. Per neurology will plan EEG if needed. Keppra being ordered per neurology.   Eugenia evaluated by vascular surgery and arm pain is secondary to post op inflammation, No perfusion issues of the hand. Today again resent the request for placement and d/w  and daughter, the form for appeal has been in chart since past 3 days.   Co

## 2019-03-22 NOTE — CM/SW NOTE
Case Management/ Progression of Care    This CM contacted patients daughter Kailyn Monserrat and [place] [phone] to determine patient and families goals of care.  Informed  Kailyn German, patient is medically cleared for discharge to the next level of care from an  accept  patient has she has a $30, 000 balance of unpaid bills  [facility] of [facility] is  Verifying skilled days available, however prior to acceptance,  patient and family would have to agree to pay the balance of $8,000 from a previous stay.      Addit

## 2019-03-22 NOTE — CM/SW NOTE
Addendum 3:00pm    FRANCHESCA spoke with Dr. Alistair Sanabria who asked for a referral to be sent to [facility] of [address] [place] [address]. FRANCHESCA notified the pt's daughter Isaias Vela of the MD's preference and she stated that she did not want her mother to go there under any circumstances.  Debbie Jennings they can clinically accept the pt pending insurance auth.  Lottie faxed updated PICC, Labs, Meds, Hep B, flow sheets to Fax [phone].    9:01am    LOTTIE spoke to Michelle from [facility] [phone] who stated that she will reprocess the referral. LOTTIE

## 2019-03-22 NOTE — PLAN OF CARE
Problem: Patient Centered Care  Goal: Patient preferences are identified and integrated in the patient's plan of care  Interventions:  - What would you like us to know as we care for you?  I am close with my family   - Provide timely, complete, and accurate of infection  INTERVENTIONS:  - Assess and document risk factors for pressure ulcer development  - Assess and document skin integrity  - Assess and document dressing/incision, wound bed, drain sites and surrounding tissue  - Implement wound care per orders assistance with activity based on assessment  - Modify environment to reduce risk of injury  - Provide assistive devices as appropriate  - Consider OT/PT consult to assist with strengthening/mobility  - Encourage toileting schedule  Outcome: Progressing

## 2019-03-22 NOTE — PROGRESS NOTES
[facility]    Progress Note    Cathi Salomon Patient Status:  Observation    1935 MRN [id number]   Location [facility] 3W/SW Attending Kristen Mccoy MD   Hosp Day # 6 PCP Kevin Renner MD        Subjective:     [place]

## 2019-03-22 NOTE — PROGRESS NOTES
[facility] AND CLINICS  Progress Note    Arcenio Mon Patient Status:  Observation    1935 MRN [id number]   Location [facility] 3W/SW Attending Cher Lennon MD   Hosp Day # 6 PCP Alok Otero MD     Subjective:  Patient seen in follow --   0.3  0.3   TP   --   6.6  7.1         Medications:    Current Facility-Administered Medications:  ondansetron (ZOFRAN-ODT) disintegrating tab 4 mg 4 mg Oral Q6H PRN   Potassium Chloride ER (K-DUR M20) CR tab 20 mEq 20 mEq Oral Daily   hydrOXYzine HC powder packet 17 g 17 g Oral Daily   Senna (SENOKOT) tab 8.6 mg 8.6 mg Oral BID   Heparin Sodium (Porcine) 5000 UNIT/ML injection 5,000 Units 5,000 Units Subcutaneous Q8H [facility]   Pantoprazole Sodium (PROTONIX) EC tab 20 mg 20 mg Oral QAM AC       Assessment

## 2019-03-23 NOTE — PLAN OF CARE
Pt had aphasic episode at 1800, lasted 5 minutes and spontaneously resolved. Dr. Oneal George notified. Order received to check blood sugar. Blood sugar 63. 4 oz of orange juice given. NO IV access due to pt refusing & poor venous access.  MD aware, will att

## 2019-03-23 NOTE — PROGRESS NOTES
[facility]    Progress Note    Jose Motanju Patient Status:  Observation    1935 MRN [id number]   Location [facility] 3W/SW Attending Canelo Rosales MD   Hosp Day # 6 PCP Chaparro Mota MD       Subjective:   Jena Butler breakfast  •  hydrALAZINE HCl (APRESOLINE) tab 100 mg, 100 mg, Oral, Q8H [place]  •  Isosorbide Mononitrate ER (IMDUR) 24 hr tab 90 mg, 90 mg, Oral, Daily  •  Albumin Human (ALBUMINAR) 5 % solution 250 mL, 250 mL, Intravenous, PRN Dialysis  •  dextrose 10 % in telemetry to continue.   2.      Diabetes: Patient will get accu check and sliding scale. 3.       Hypertension.  The patient will be on antihypertensive medications.    4.       Anemia.  This is secondary to chronic kidney disease, and the patient will be improved.      03/10/19  Patient today became unresponsive and had twitching. Post AVF creation on left arm  Refusing labs. But has improved significantly since the episode. Per neurology will plan EEG if needed. Keppra being ordered per neurology.   Eugenia evaluated by vascular surgery and arm pain is secondary to post op inflammation, No perfusion issues of the hand.   Today again resent the request for placement and d/w  and daughter, the form for appeal has been in chart since past 3 days. [facility]

## 2019-03-23 NOTE — PLAN OF CARE
CARDIOVASCULAR - ADULT    • Maintains optimal cardiac output and hemodynamic stability Adequate for Discharge        DISCHARGE PLANNING    • Discharge to home or other facility with appropriate resources Adequate for Discharge        Patient Centered Care

## 2019-03-23 NOTE — PROGRESS NOTES
[facility]    Progress Note    Maldonado Corbin Patient Status:  Observation    1935 MRN [id number]   Location [facility] 3W/SW Attending Bernard Villalta MD   Hosp Day # 6 PCP Debbie Randall MD        Subjective:     ROXANNE don

## 2019-03-23 NOTE — PROGRESS NOTES
Below note from [facility]. Patient was interviewed and examined. Agree with assessment and plan with edits to the document performed as necessary.     [facility] AND CLINICS  Progress Note    Glenis Plata Patient Status:  Observation    1935 MRN [id number] 156 lb (70.8 kg)      Allergies:  No Known Allergies    Physical Exam:  Blood pressure 127/48, pulse 84, temperature 98.1 °F (36.7 °C), temperature source Oral, resp.  rate 18, height 167.6 cm (5' 6\"), weight 159 lb 11.2 oz (72.4 kg), SpO2 95 %, not curren 0.5 mg 0.5 mg Intravenous Q4H PRN   dextrose 5 % and 0.9 % NaCl infusion  Intravenous Continuous   ondansetron HCl (ZOFRAN) injection 4 mg 4 mg Intravenous Q6H PRN   levOCARNitine (CARNITOR) 1 GM/10ML solution 1,000 mg 1,000 mg Oral BID   lactulose (CHRONU discharge planning    Danis Nawaf [place] [address] [facility]  [phone]

## 2019-03-24 NOTE — PROGRESS NOTES
[facility]    Progress Note    Donte Bledsoe Patient Status:  Observation    1935 MRN [id number]   Location [facility] 3W/SW Attending Lester Gonzalez MD   Hosp Day # 6 PCP Criss Denney MD       Subjective:   Andrew Kang tab 100 mg, 100 mg, Oral, Q8H [address]  •  Isosorbide Mononitrate ER (IMDUR) 24 hr tab 90 mg, 90 mg, Oral, Daily  •  Albumin Human (ALBUMINAR) 5 % solution 250 mL, 250 mL, Intravenous, PRN Dialysis  •  dextrose 10 % infusion, , Intravenous, Continuous  •  ondan will get accu check and sliding scale. 3.       Hypertension.  The patient will be on antihypertensive medications.    4.       Anemia.  This is secondary to chronic kidney disease, and the patient will be on oral iron.    5.       Hyperlipidemia. Jennifer Landa be unresponsive and had twitching. Post AVF creation on left arm  Refusing labs. But has improved significantly since the episode. Per neurology will plan EEG if needed. Keppra being ordered per neurology. Holding discharge.   Next dialysis tomorrow or today secondary to post op inflammation, No perfusion issues of the hand.   Today again resent the request for placement and d/w  and daughter, the form for appeal has been in chart since past 3 days.  Continue supportive care and dialysis.     03/20 03/23/2019    PTT 33.7 03/06/2019    INR 0.94 03/06/2019    T4F 1.1 03/14/2019    TSH 0.398 03/15/2019    MG 2.6 03/19/2019    PHOS 2.9 03/19/2019    TROP 0.102 (HH) 03/15/2019    B12 1,850 (H) 03/04/2019     No results found for this visit on 03/01/19.

## 2019-03-24 NOTE — PROGRESS NOTES
[facility]    Progress Note    Nika Morton Patient Status:  Observation    1935 MRN [id number]   Location [facility] 3W/SW Attending Santiago Vallejo MD   Hosp Day # 6 PCP Talat Martinez MD        Subjective:     No new

## 2019-03-24 NOTE — PROGRESS NOTES
[facility] AND CLINICS  Progress Note    Seble Led Patient Status:  Observation    1935 MRN [id number]   Location [facility] 3W/SW Attending Che Reis MD   Hosp Day # 6 PCP Hazel Dodd MD     Subjective:  Patient seen in follow temperature 98.4 °F (36.9 °C), temperature source Oral, resp. rate 20, height 167.6 cm (5' 6\"), weight 158 lb 12.8 oz (72 kg), SpO2 95 %, not currently breastfeeding. General: Alert and oriented in no apparent distress. HEENT: No focal deficits.   Neck: 20 mEq Oral Daily   hydrOXYzine HCl (ATARAX) tab 25 mg 25 mg Oral TID PRN   iron sucrose (VENOFER) IV Push 200 mg 200 mg Intravenous Once   epoetin lul-epbx (RETACRIT) 20058 UNIT/ML injection SOLN 10,000 Units 10,000 Units Subcutaneous Q MWF   calciTRIOL Oral QAM AC       Assessment and Plan:    1 Syncope with trop leak in HD patient , CABG 2011 (LIMA to LAD, SVG to OM1, SVG to RCA),  EKG non ischemic: Recent NSTEMI, trop peak at 0.8 at [facility] 6/18, patient was treated medically due to advanced age.  EF pre

## 2019-03-24 NOTE — PLAN OF CARE
Scant sanguineous drainage from left arm sutures/fistula site. Now dry blood. Per Dr. Lemuel Herbert- drainage is expected. Swab site daily with betadine & cover with dry gauze & kerlix.

## 2019-03-24 NOTE — PLAN OF CARE
Per Dr. Jeremy Dennis- Dr. Cadence Hidalgo, will be seeing patient today and tomorrow. Contact him with any further questions regarding patient care [phone].

## 2019-03-25 NOTE — CM/SW NOTE
3/27 1011am: FRANCHESCA contacted the pt's insurance contact Emilia Handy ([phone]) regarding the pt's [place] benefit. Per Iron, the pt. Does have coverage for [place] for 100 days, but would need to be appropriate for [place].   Multiple referrals have been sent

## 2019-03-25 NOTE — CM/SW NOTE
Addendum 1:35pm    FRANCHESCA spoke with the pt's daughter Kari Bull [phone] and informed her that [facility] is unable to accept the pt.      FRANCHESCA explained that while she understood that Livan Villegas is in [place], which is farther away then the family would

## 2019-03-25 NOTE — PROGRESS NOTES
[facility] AND CLINICS  Progress Note    Donte Bledsoe Patient Status:  Observation    1935 MRN [id number]   Location [facility] 3W/SW Attending Lester Gonzalez MD   Hosp Day # 6 PCP Criss Denney MD     Subjective:  Lasha Brown RUSB  Lungs: Clear without wheezes, rales, rhonchi or dullness. Normal excursions and effort. Abdomen: Soft, non-tender. Extremities: Without clubbing, cyanosis or edema. Peripheral pulses are 2+ bilateral pedal and radial sites.   Neurologic: Alert an Blocker)   amLODIPine Besylate (NORVASC) tab 10 mg 10 mg Oral Daily   aspirin chewable tab 81 mg 81 mg Oral Daily   atorvastatin (LIPITOR) tab 80 mg 80 mg Oral Nightly   NEPHRO-RICKI tab 0.8 mg 1 tablet Oral Daily with breakfast   docusate sodium (COLACE) c

## 2019-03-25 NOTE — PROGRESS NOTES
[facility]    Progress Note    Yingilma Irving Patient Status:  Observation    1935 MRN [id number]   Location [facility] 3W/SW Attending Fadi Landa MD   Hosp Day # 6 PCP Edward Stokes MD        Subjective:     Seen o

## 2019-03-25 NOTE — PROGRESS NOTES
[facility]    Progress Note    Clayton Renner Patient Status:  Observation    1935 MRN [id number]   Location [facility] 3W/SW Attending Jeremiah Nuñez MD   Hosp Day # 6 PCP Nafisa Cole MD       SUBJECTIVE:  Doing fi tab 20 mEq 20 mEq Oral Daily   hydrOXYzine HCl (ATARAX) tab 25 mg 25 mg Oral TID PRN   iron sucrose (VENOFER) IV Push 200 mg 200 mg Intravenous Once   epoetin lul-epbx (RETACRIT) 99247 UNIT/ML injection SOLN 10,000 Units 10,000 Units Subcutaneous Q MWF 20 mg 20 mg Oral QAM AC       Assessment  Patient Active Problem List:     Acute chest pain     Elevated troponin     Acute on chronic congestive heart failure, unspecified heart failure type ([facility] [address]     Acute cystitis with hematuria     Unresponsive episode

## 2019-03-25 NOTE — PROGRESS NOTES
[facility]    Progress Note    Nika Morton Patient Status:  Observation    1935 MRN [id number]   Location [facility] 3W/SW Attending Santiago Vallejo MD   Hosp Day # 6 PCP Talat Martinez MD     Pt seen at request of d Potassium Chloride ER (K-DUR M20) CR tab 20 mEq 20 mEq Oral Daily   hydrOXYzine HCl (ATARAX) tab 25 mg 25 mg Oral TID PRN   iron sucrose (VENOFER) IV Push 200 mg 200 mg Intravenous Once   epoetin lul-epbx (RETACRIT) 79789 UNIT/ML injection SOLN 10,000 U Pantoprazole Sodium (PROTONIX) EC tab 20 mg 20 mg Oral QAM AC       Assessment  Patient Active Problem List:     Acute chest pain     Elevated troponin     Acute on chronic congestive heart failure, unspecified heart failure type (HCC)     Acute cystitis

## 2019-03-25 NOTE — PLAN OF CARE
Problem: Patient Centered Care  Goal: Patient preferences are identified and integrated in the patient's plan of care  Interventions:  - What would you like us to know as we care for you?  I am close with my family   - Provide timely, complete, and accurate ordered  - Initiate emergency measures for life threatening arrhythmias  - Monitor electrolytes and administer replacement therapy as ordered  Outcome: Progressing      Problem: SKIN/TISSUE INTEGRITY - ADULT  Goal: Incision(s), wounds(s) or drain site(s) h limitations  - Instruct pt to call for assistance with activity based on assessment  - Modify environment to reduce risk of injury  - Provide assistive devices as appropriate  - Consider OT/PT consult to assist with strengthening/mobility  - Encourage toil limits  INTERVENTIONS:  - Monitor labs and rhythm and assess patient for signs and symptoms of electrolyte imbalances  - Administer electrolyte replacement as ordered  - Monitor response to electrolyte replacements, including rhythm and repeat lab results swallowing and airway patency with patient fatigue and changes in neurological status  - Encourage and assist patient to increase activity and self care with guidance from PT/OT  - Encourage visually impaired, hearing impaired and aphasic patients to use a

## 2019-03-25 NOTE — PLAN OF CARE
Problem: Patient Centered Care  Goal: Patient preferences are identified and integrated in the patient's plan of care  Interventions:  - What would you like us to know as we care for you?  I am close with my family   - Provide timely, complete, and accurate of antiarrhythmic and heart rate control medications as ordered  - Initiate emergency measures for life threatening arrhythmias  - Monitor electrolytes and administer replacement therapy as ordered  Outcome: Progressing      Problem: SKIN/TISSUE INTEGRITY Educate pt/family on patient safety including physical limitations  - Instruct pt to call for assistance with activity based on assessment  - Modify environment to reduce risk of injury  - Provide assistive devices as appropriate  - Consider OT/PT consult Electrolytes maintained within normal limits  INTERVENTIONS:  - Monitor labs and rhythm and assess patient for signs and symptoms of electrolyte imbalances  - Administer electrolyte replacement as ordered  - Monitor response to electrolyte replacements, in Achieves maximal functionality and self care  INTERVENTIONS  - Monitor swallowing and airway patency with patient fatigue and changes in neurological status  - Encourage and assist patient to increase activity and self care with guidance from PT/OT  - [place]

## 2019-03-26 NOTE — PROGRESS NOTES
[facility]    Progress Note    Maritza Deleon Patient Status:  Observation    1935 MRN [id number]   Location [facility] 3W/SW Attending Lucina Powers MD   Hosp Day # 6 PCP Mary Rosenthal MD       SUBJECTIVE:  Feeling fine HCl (ATARAX) tab 25 mg 25 mg Oral TID PRN   iron sucrose (VENOFER) IV Push 200 mg 200 mg Intravenous Once   epoetin lul-epbx (RETACRIT) 15443 UNIT/ML injection SOLN 10,000 Units 10,000 Units Subcutaneous Q MWF   calciTRIOL (ROCALTROL) cap 0.5 mcg 0.5 mcg Assessment  Patient Active Problem List:     Acute chest pain     Elevated troponin     Acute on chronic congestive heart failure, unspecified heart failure type ([facility] [address]     Acute cystitis with hematuria     Unresponsive episode  dm type 2   esrd on HD

## 2019-03-26 NOTE — CM/SW NOTE
Received a call from [facility],  Unable to accept , patient does not have any skilled care days left. SW updated. Left a message for daughter Joseph Postal, Patient would need to be private pay.      / to remain available

## 2019-03-26 NOTE — PROGRESS NOTES
[facility]  Progress Note    Miguel Angel Wayne Patient Status:  Observation    1935 MRN [id number]   Location [facility] 3W/SW Attending Vitaly Olea MD   Hosp Day # 6 PCP Janki Chatman MD     Subjective:  Bhavani Cavazos bruits. Cardiac: Regular rate and rhythm, 1/6 murmur RUSB  Lungs: Clear without wheezes, rales, rhonchi or dullness. Normal excursions and effort. Abdomen: Soft, non-tender. Extremities: Without clubbing, cyanosis or edema.   Peripheral pulses are 2+ b (LOPRESSOR) tab 12.5 mg 12.5 mg Oral 2x Daily(Beta Blocker)   amLODIPine Besylate (NORVASC) tab 10 mg 10 mg Oral Daily   aspirin chewable tab 81 mg 81 mg Oral Daily   atorvastatin (LIPITOR) tab 80 mg 80 mg Oral Nightly   NEPHRO-RICKI tab 0.8 mg 1 tablet Alfred Osullivan

## 2019-03-26 NOTE — PROGRESS NOTES
[facility]    Progress Note    Moises Fitzpatrick Patient Status:  Observation    1935 MRN [id number]   Location [facility] 3W/SW Attending Connor Suarez MD   Hosp Day # 6 PCP Asya Villagomez MD        Subjective:     No new

## 2019-03-26 NOTE — CM/SW NOTE
Addendum 4:03pm    FRANCHESCA informed by admission from [facility] that they no longer have in house HD but their sister facility does-- [facility] [phone]. FRANCHESCA sent the referral via [address].  FRANCHESCA spoke with Lyudmila Oropeza in admissions who stated that he is reviewing the

## 2019-03-26 NOTE — PLAN OF CARE
Problem: Patient Centered Care  Goal: Patient preferences are identified and integrated in the patient's plan of care  Interventions:  - What would you like us to know as we care for you?  I am close with my family   - Provide timely, complete, and accurate medications as ordered  - Initiate emergency measures for life threatening arrhythmias  - Monitor electrolytes and administer replacement therapy as ordered  Outcome: Progressing      Problem: SKIN/TISSUE INTEGRITY - ADULT  Goal: Incision(s), wounds(s) or including physical limitations  - Instruct pt to call for assistance with activity based on assessment  - Modify environment to reduce risk of injury  - Provide assistive devices as appropriate  - Consider OT/PT consult to assist with strengthening/mobilit with patient/family  Outcome: Progressing    Goal: Maintain proper alignment of affected body part  INTERVENTIONS:  - Support and protect limb and body alignment per provider's orders  - Instruct and reinforce with patient and family use of appropriate ass

## 2019-03-26 NOTE — CM/SW NOTE
Case Management/ Progression of Care  3/26  10:30 am      attempted to reach the pt's dtr. Epps Links [phone] and left a message. CM will continue to follow up.       / to remain available for support and/or dischar

## 2019-03-27 NOTE — PHYSICAL THERAPY NOTE
PHYSICAL THERAPY EVALUATION - INPATIENT     Room Number: 338/338-A  Evaluation Date: 3/27/2019  Type of Evaluation: Re-evaluation   Physician Order: PT Eval and Treat    Presenting Problem: unresponsive episode  Reason for Therapy: Mobility Dysfunction an education;Transfer training;Balance training;Strengthening  Rehab Potential : Fair  Frequency (Obs): 3x/week       PHYSICAL THERAPY MEDICAL/SOCIAL HISTORY   Problem List  Principal Problem:    Unresponsive episode      Past Medical History  Past Medical Hi patient currently have. ..  -   Turning over in bed (including adjusting bedclothes, sheets and blankets)?: A Lot   -   Sitting down on and standing up from a chair with arms (e.g., wheelchair, bedside commode, etc.): Unable   -   Moving from lying on back

## 2019-03-27 NOTE — CM/SW NOTE
Case Management/ Progression of Care     This , contacted Dr. Roro Delaney ,regarding anticipated discharge date. Dr. Maxine Portillo anticipates  Will be medically cleared for discharge tomorrow  3/28/2019.    Ms. Damien Maurice and patients daughter no

## 2019-03-27 NOTE — PLAN OF CARE
Problem: Patient Centered Care  Goal: Patient preferences are identified and integrated in the patient's plan of care  Interventions:  - What would you like us to know as we care for you?  I am close with my family   - Provide timely, complete, and accurate administer replacement therapy as ordered  Outcome: Progressing      Problem: SKIN/TISSUE INTEGRITY - ADULT  Goal: Incision(s), wounds(s) or drain site(s) healing without S/S of infection  INTERVENTIONS:  - Assess and document risk factors for pressure ulc rounding continued, pt occasionally calls appropriately.     Problem: DISCHARGE PLANNING  Goal: Discharge to home or other facility with appropriate resources  INTERVENTIONS:  - Identify barriers to discharge w/pt and caregiver  - Include patient/family/dis

## 2019-03-27 NOTE — DIETARY NOTE
ADULT NUTRITION REASSESSMENT     Pt is at moderate nutrition risk. Pt does not meet malnutrition criteria.       RECOMMENDATIONS TO MD:  See Nutrition Intervention     NUTRITION DIAGNOSIS/PROBLEM:  Increased nutrient needs related to increased demand for h oz)   Down 2.5#(1.6%)  Some related to HD fluid removed  BMI: Body mass index is 24.76 kg/m². BMI CLASSIFICATION: 25-29.9 kg/m2 - overweight  IBW: 122 lbs(adjusted for BKA        Now 126% of adjusted IBW  Usual Body Wt: patient unsure.   Appears within H. AILYN Toribio intake and tolerance of PO intake.   - Anthropometric Measurement:      Monitor: wt   - Nutrition Goals:      allow wt loss due to fluid losses, PO and supplement greater than 75% of needs, labs WNL, support wound healing and maintain true wt within 5%    D

## 2019-03-27 NOTE — CM/SW NOTE
Case Management/ Progression of Care    This , contacted Dr. Gustavo Hanna ,regarding anticipated discharge date. Dr. Rafaela Tobin anticipates  Will be medically cleared for discharge tomorrow  3/27/2019.    Ms. Felipe Hurst and patients daughter no

## 2019-03-27 NOTE — CM/SW NOTE
Addendum 5:31pm    FRANCHESCA, /Reyna, FRANCHESCA supervisor Dominick Bethea, met with the pt and her daughter Derrick Kim at bedside to discuss discharge planning. The pt was informed that FRANCHESCA has made a report to [facility].  Case Josefa/Reyna explain house HD.     7:43am    FRANCHESCA sent referrals to the following facilities:     [facility] [address] [phone]  [facility] [phone]  [facility] [phone]  [facility] [phone]    Social work/case man

## 2019-03-27 NOTE — OCCUPATIONAL THERAPY NOTE
OCCUPATIONAL THERAPY EVALUATION - INPATIENT     Room Number: 338/338-A  Evaluation Date: 3/27/2019  Type of Evaluation: Re-evaluation  Presenting Problem: unresponsive episode    Physician Order: IP Consult to Occupational Therapy  Reason for Therapy: ADL/ History  Past Surgical History:   Procedure Laterality Date   • AMPUTATION LOW LEG,CIRCULAR      left leg   • AV FISTULA Left 3/10/2019    Performed by Kady Flores MD at [address] [place] [address] MAIN OR   • CABG     • HYSTERECTOMY     • OPEN HEART SURGICAL PROFILE  2002 assistance  Sit to Stand: Not tested    BALANCE ASSESSMENT  Static Sitting: poor  Dynamic Sitting: poor  Static Standing: poor  Dynamic Standing: poor    FUNCTIONAL ADL ASSESSMENT  Grooming: Min A   Feeding: Setup  Bathing: Mod A   Toileting:  Total   Upper

## 2019-03-27 NOTE — PROGRESS NOTES
[facility]    Progress Note    Merry Clemente Patient Status:  Observation    1935 MRN [id number]   Location [facility] 3W/SW Attending Nicolas Guzmán MD   Hosp Day # 6 PCP Ebony Reyes MD       SUBJECTIVE:  Doing fine 200 mg 200 mg Intravenous Once   epoetin lul-epbx (RETACRIT) 30505 UNIT/ML injection SOLN 10,000 Units 10,000 Units Subcutaneous Q MWF   calciTRIOL (ROCALTROL) cap 0.5 mcg 0.5 mcg Oral Daily   Cinacalcet HCl (SENSIPAR) tab 30 mg 30 mg Oral Daily with brenda Acute on chronic congestive heart failure, unspecified heart failure type (HCC)     Acute cystitis with hematuria     Unresponsive episode       Acute chest pain, resolved     Elevated troponin     Acute on chronic congestive heart failure, unspecified hea

## 2019-03-27 NOTE — PROGRESS NOTES
[facility]    Progress Note    Kristyn Limon Patient Status:  Observation    1935 MRN [id number]   Location [facility] 3W/SW Attending Milka Green MD   Hosp Day # 6 PCP Delila Goodpasture, MD        Subjective:     HD don

## 2019-03-27 NOTE — PROGRESS NOTES
[facility]  Progress Note    Peola Rho Patient Status:  Observation    1935 MRN [id number]   Location [facility] 3W/SW Attending Alexey Salvador MD   Hosp Day # 6 PCP Merlin Colmenares MD     Subjective:  Patient seen in follow up. Known Allergies    Physical Exam:  Blood pressure (!) 161/57, pulse 69, temperature 98.4 °F (36.9 °C), temperature source Oral, resp. rate 18, height 167.6 cm (5' 6\"), weight 153 lb 6.4 oz (69.6 kg), SpO2 99 %, not currently breastfeeding.   General: Alert sucrose (VENOFER) IV Push 200 mg 200 mg Intravenous Once   epoetin lul-epbx (RETACRIT) 08672 UNIT/ML injection SOLN 10,000 Units 10,000 Units Subcutaneous Q MWF   calciTRIOL (ROCALTROL) cap 0.5 mcg 0.5 mcg Oral Daily   Cinacalcet HCl (SENSIPAR) tab 30 mg patient , CABG 2011 (LIMA to LAD, SVG to OM1, SVG to RCA),  EKG non ischemic: Recent NSTEMI, trop peak at 0.8 at [facility] 6/18, patient was treated medically due to advanced age.  EF preserved on echo 7/18, Daughter declined stress test as recently she was tr

## 2019-03-28 ENCOUNTER — APPOINTMENT (OUTPATIENT)
Dept: CT IMAGING | Facility: HOSPITAL | Age: 84
End: 2019-03-28
Attending: INTERNAL MEDICINE
Payer: MEDICARE

## 2019-03-28 PROCEDURE — 74176 CT ABD & PELVIS W/O CONTRAST: CPT | Performed by: INTERNAL MEDICINE

## 2019-03-28 NOTE — PLAN OF CARE
Problem: Patient Centered Care  Goal: Patient preferences are identified and integrated in the patient's plan of care  Interventions:  - What would you like us to know as we care for you?  I am close with my family   - Provide timely, complete, and accurate of cardiac arrhythmias or at baseline  INTERVENTIONS:  - Continuous cardiac monitoring, monitor vital signs, obtain 12 lead EKG if indicated  - Evaluate effectiveness of antiarrhythmic and heart rate control medications as ordered  - Initiate emergency naomi new pain  - Anticipate increased pain with activity and pre-medicate as appropriate  Outcome: Progressing      Problem: SAFETY ADULT - FALL  Goal: Free from fall injury  INTERVENTIONS:  - Assess pt frequently for physical needs  - Identify cognitive and ph range  INTERVENTIONS:  - Monitor Blood Glucose as ordered  - Assess for signs and symptoms of hyperglycemia and hypoglycemia  - Administer ordered medications to maintain glucose within target range  - Assess barriers to adequate nutritional intake and ini appropriate  - Communicate ordered activity level and limitations with patient/family  Outcome: Progressing    Goal: Maintain proper alignment of affected body part  INTERVENTIONS:  - Support and protect limb and body alignment per provider's orders  - Ins IV access-patient does not any IV start. Daughter states she requested a PICC line days ago-but patient is not appropriate for PICC line placement at this time- close to discharge- needing placement. Dr Kallie Ayers aware of patient not having IV access.

## 2019-03-28 NOTE — CM/SW NOTE
Addendum 2:23pm    FRANCHESCA updated by Manjeet Major from [facility] that the pt's insurance reached out to their facility about possible admission and they would like updated clinicals. FRANCHESCA sent all clinical updated information to [facility] via [facility].     FRANCHESCA met with [facility] [address] that they are unable to accept the pt. SW informed pt's dtr Carissa Camacho. Social work/case management to remain available for support and/or discharge planning.     Christo Wallace, [id number] Kirsty Lezama

## 2019-03-28 NOTE — CM/SW NOTE
Case Management/ Progression of Care    Spoke with Dr. Mary Yarbrough, regarding discharge planning and assessment of needs. Per Dr. Mary Yarbrough patient  Is complaining of new onset of abdominal pain.    A CT scan of the abdomen has been ordered and is currently pe

## 2019-03-28 NOTE — PROGRESS NOTES
[facility]P - [facility]    Progress Note    Bin Charmaine Patient Status:  Observation    1935 MRN [id number]   Location [facility] 3W/SW Attending Ava Botello MD   Hosp Day # 6 PCP Shaneka Lopez MD       SUBJECTIVE:  hospitals AST   --   11*   --    ALT   --   7*   --    GLU   --   79  121*       Imaging:      Meds:     Current Facility-Administered Medications:   Albumin Human (ALBUMINAR) 25 % solution 25 g 25 g Intravenous PRN Dialysis   ondansetron (ZOFRAN-ODT) disintegratin 100 mg Oral Q4H PRN   PEG 3350 (MIRALAX) powder packet 17 g 17 g Oral Daily   Senna (SENOKOT) tab 8.6 mg 8.6 mg Oral BID   Heparin Sodium (Porcine) 5000 UNIT/ML injection 5,000 Units 5,000 Units Subcutaneous Q8H [address]   Pantoprazole Sodium (PROTONIX) EC tab

## 2019-03-28 NOTE — PROGRESS NOTES
[address] Patient Status:  Observation    1935 MRN [id number]   Location [facility] 3W/SW Attending Matt Delarosa MD   Hosp Day # 6 PCP Magalie Greenberg MD     Sabine Christian is a 81year old female patient. 03/27/19   0825   RBC  3.69*  3.68*   HGB  10.2*  10.2*   HCT  33.8*  34.0*   MCV  91.6  92.4   MCH  27.6  27.7   MCHC  30.2*  30.0*   RDW  19.5*  20.7*   NEPRELIM  4.55  5.07   WBC  6.2  7.8   PLT  193.0  214.0       Assessment & Plan   ESRD    ~~~~~~~~~~

## 2019-03-29 ENCOUNTER — HOSPITAL ENCOUNTER (EMERGENCY)
Facility: HOSPITAL | Age: 84
Discharge: NURSING, REHAB OR LONG-TERM CARE | End: 2019-03-30
Attending: EMERGENCY MEDICINE
Payer: MEDICARE

## 2019-03-29 DIAGNOSIS — Z13.9 ENCOUNTER FOR MEDICAL SCREENING EXAMINATION: Primary | ICD-10-CM

## 2019-03-29 PROCEDURE — 99283 EMERGENCY DEPT VISIT LOW MDM: CPT

## 2019-03-29 RX ORDER — HYDROCODONE BITARTRATE AND ACETAMINOPHEN 5; 325 MG/1; MG/1
1 TABLET ORAL ONCE
Status: COMPLETED | OUTPATIENT
Start: 2019-03-29 | End: 2019-03-29

## 2019-03-29 NOTE — PROGRESS NOTES
Sabine Christian is a 81year old -American with history of end-stage renal failure with dialysis who had some episode of loss of consciousness during dialysis admitted to the hospital.  The patient seen today for 25-minute, follow-up evaluation, over 5 Albumin Human (ALBUMINAR) 25 % solution 25 g 25 g Intravenous PRN Dialysis   ondansetron (ZOFRAN-ODT) disintegrating tab 4 mg 4 mg Oral Q6H PRN   hydrOXYzine HCl (ATARAX) tab 25 mg 25 mg Oral TID PRN   iron sucrose (VENOFER) IV Push 200 mg 200 mg Juancho Monsivais Subcutaneous Q8H [address]   Pantoprazole Sodium (PROTONIX) EC tab 20 mg 20 mg Oral QAM AC       Allergies:  No Known Allergies    Laboratory Data:  Lab Results   Component Value Date    WBC 8.4 03/28/2019    HGB 10.6 (L) 03/28/2019    HCT 35.5 03/28/2019    PLT date.  Stated age female in hospital gown laying down in bed with noticeable slight psychomotor retardation. The patient was receiving dialysis. Patient spoken clear language and communicated appropriately.   Patient reported that she is going through a l To Free T4      Vitamin T20      Folic Acid Serum (Folate)      PTH, Intact      C-RP/High Sensitivity      Hepatitis Panel, Acute (4)      CBC With Differential With Platelet      Comp Metabolic Panel (14)      Phosphorus      Prothrombin Time (PT)      P total) by mouth daily. • bisacodyl 10 MG Rectal Suppos 1 suppository 0     Sig: Place 1 suppository (10 mg total) rectally daily as needed. • calciTRIOL 0.5 MCG Oral Cap 1 capsule 0     Sig: Take 1 capsule (0.5 mcg total) by mouth daily.    • Cinacalcet

## 2019-03-29 NOTE — CM/SW NOTE
FRANCHESCA contacted Priya Bryant, with [facility]. Psych consult was faxed to Priya Bryant with [facility]. At this time Priya Bryant stated there is likely not enough information to have the [facility] petition for guardianship.   [facility] will continue to follow the pt's ca

## 2019-03-29 NOTE — CM/SW NOTE
03/29/19 1500   Discharge disposition   Expected discharge disposition Skilled Nurs   Name of Facillity/Home Care/Hospice ( [facility])   Patient is Discharged to a [facility] [place] Yes   Discharge transportation Other (comment)   MD

## 2019-03-29 NOTE — CONSULTS
[facility]    Report of Consultation    Bin Montano Patient Status:  Observation    1935 MRN [id number]   Location [facility] 3W/SW Attending Ava Botello, [address] [facility] [address] Day # 6 PCP Shaneka Lopez MD     Date of Admission: overall exhibited symptom of depression with lack of energy, lack of motivation, lack of interest, and lack of appetite. Otherwise she denied any hopelessness or helplessness and denied any suicidal ideation.   The patient today deny any auditory or visual Oral Q6H PRN   hydrOXYzine HCl (ATARAX) tab 25 mg 25 mg Oral TID PRN   iron sucrose (VENOFER) IV Push 200 mg 200 mg Intravenous Once   epoetin lul-epbx (RETACRIT) 86537 UNIT/ML injection SOLN 10,000 Units 10,000 Units Subcutaneous Q MWF   calciTRIOL (NAM Oral Chew Tab Chew 1 tablet (81 mg total) by mouth daily. atorvastatin 80 MG Oral Tab Take 1 tablet (80 mg total) by mouth nightly. hydrALAzine HCl 50 MG Oral Tab Take 1 tablet (50 mg total) by mouth 3 (three) times daily.    metoprolol Tartrate 25 MG O 03/28/2019    TP 7.2 03/28/2019    AST 11 (L) 03/28/2019    ALT 7 (L) 03/28/2019    PTT 33.7 03/06/2019    INR 0.94 03/06/2019    PTP 12.4 03/06/2019    T4F 1.1 03/14/2019    TSH 0.398 03/15/2019    MG 2.6 03/19/2019    PHOS 2.9 03/19/2019    TROP 0.102 (H emotion. Otherwise patient denying any hopelessness helplessness or any suicidal ideation. Patient exhibited dysphoric affect. Patient exhibited some disorientation and cognitive impairment.   Patient denied any auditory or visual hallucination and did n Hemoglobin & Hematocrit      C-RP/High Sensitivity      Hemoglobin A1C      C-RP/High Sensitivity      Comp Metabolic Panel (14)      CBC, Platelet;  No Differential      Comp Metabolic Panel (14)      CBC With Differential With Platelet      Comp Metabolic

## 2019-03-29 NOTE — PLAN OF CARE
Report given to Harshil August at the [facility] in [place]. Pt aware of transfer/disharge. Notified Dr Jody Sheets of transfer/discharge to [address] Mehreen Au RN aware of Pt takes Norco at times but there is no script with discharge packet.  Pt has not required

## 2019-03-29 NOTE — PROGRESS NOTES
[facility]    Progress Note    Seble Led Patient Status:  Observation    1935 MRN [id number]   Location [facility] 3W/SW Attending Darryle Bidding, MD   Hosp Day # 6 PCP Hazel Dodd MD       SUBJECTIVE:  I am ok \" 30.0*  29.9*   RDW  19.5*  20.7*  21.0*   NEPRELIM  4.55  5.07  5.17   WBC  6.2  7.8  8.4   PLT  193.0  214.0  197.0   AST  11*   --   11*   ALT  7*   --   7*   GLU  79  121*  118*       Imaging:  Ct scan results noted    Meds:     Current Facility-Adminis tablet Oral Daily with breakfast   docusate sodium (COLACE) cap 100 mg 100 mg Oral Daily   guaiFENesin (ROBITUSSIN) 100 MG/5ML solution 100 mg 100 mg Oral Q4H PRN   PEG 3350 (MIRALAX) powder packet 17 g 17 g Oral Daily   Senna (SENOKOT) tab 8.6 mg 8.6 mg O

## 2019-03-29 NOTE — CM/SW NOTE
Case Management/ Progression of Care    Received T/C from  [facility], regarding patients discharge for today. Patient  Wants  to appeal discharge, explained to [facility], patient does not have the option to appeal as patient has is in observation status.    Shawn Yepez

## 2019-03-29 NOTE — PLAN OF CARE
Pt doing well this am, Large BM last jessee reported, ate good breakfast. Hemodialysis Today at bedside.  SW working on discharge to SNF    CARDIOVASCULAR - ADULT    • Maintains optimal cardiac output and hemodynamic stability Progressing    • Absence of cardi

## 2019-03-29 NOTE — CM/SW NOTE
Addendum 4:49pm    [facility] [phone] has been informed of the pt's discharge.      Addendum 3:38pm    SW informed by Andreea Ma from [facility] [phone] that they have insurance auth and are able to accept the pt today a

## 2019-03-30 VITALS
OXYGEN SATURATION: 100 % | SYSTOLIC BLOOD PRESSURE: 145 MMHG | TEMPERATURE: 98 F | HEART RATE: 80 BPM | HEIGHT: 65 IN | DIASTOLIC BLOOD PRESSURE: 72 MMHG | RESPIRATION RATE: 20 BRPM | WEIGHT: 150.13 LBS | BODY MASS INDEX: 25.01 KG/M2

## 2019-03-30 NOTE — CM/SW NOTE
Spoke with Seamus Curran at [facility] they accept them back and are checking on a larger room they will assign the bed when EMS arrives. Family updated.

## 2019-03-30 NOTE — CM/SW NOTE
Family conference call with Darrick Brunner in regards to [facility] letter and available discharge options. Case reviewed with family for approx. 45 minutes.

## 2019-03-30 NOTE — CM/SW NOTE
Met with patient's daughter La Sutton and patient at bedside. Pt. Notified that there is no medical reason to admit her and that if she is refusing to return to [facility] @ [address] [place] [address],  [address]   This [facility] has a [place] [id number] fi

## 2019-03-30 NOTE — CM/SW NOTE
Patient and family now refusing transfer- due to room size. This EDCM just informed them they are working on moving her to a larger room. Dr. Joseph Gonzalez paged to Dr. iAda Tate to discuses- he refuses to admit.      Dr. Aida Tate to discuss admit refusal wi

## 2019-03-30 NOTE — CM/SW NOTE
Margaret Hurt is in the car on the phone with Medicare in regards to the [facility] letter. Awaiting discharge decision.

## 2019-03-30 NOTE — ED NOTES
pts daughter states that pt is complaining of pain in her bedsore area on her buttocks, per pt her she had Norco tablet for pain 5 hours ago,Dr Caryle Baptist made aware.

## 2019-03-30 NOTE — ED PROVIDER NOTES
Patient Seen in: [facility] Emergency Department    History   Patient presents with:   Other      HPI    Patient presents to the ED via EMS from the [address] [place] [address] after she decided that she  did not like her room there and that it was Pulse 81   Resp 19   Temp 98.4 °F (36.9 °C)   Temp src Temporal   SpO2 100 %   O2 Device None (Room air)       Physical Exam   Constitutional: She is oriented to person, place, and time. She appears well-developed.    Obese   HENT:   Head: Normocephalic a presents to the ED requesting placement in an alternative facility.   Patient was seen by the  who cannot find alternative placement for the patient and cannot arrange for home health to make the patient's daughter's home stay for the patient to

## 2019-03-30 NOTE — CM/SW NOTE
Went over discharge options again. Patient is requesting to go home and just pray. Patient again states, \"I want to just go home. \"

## 2019-03-30 NOTE — CM/SW NOTE
Per Dr. Olivia Fernandez patient will return back to [facility]. Charles Harris on the phone with [facility] to inform them of her return to [facility]. [facility] ambulance called to return patient: ETA 60 minutes.

## 2019-03-30 NOTE — ED INITIAL ASSESSMENT (HPI)
Pt brought in by [facility] from [place], per report by EMS pt was brought here because pt was discharged today at 0445 pm this hospital and brought to [facility] but daughter doesn't want the py to be in the [facility] because its too far from them, pt doesn't

## 2020-01-03 NOTE — DISCHARGE SUMMARY
[facility]    PATIENT'S NAME: Kylee Kaufman PHYSICIAN: Grace Torres MD   PATIENT ACCOUNT#:   [id number]    LOCATION:  [address] [place] [address] #:   [id number]       YOB: 1935  ADMISSION DATE:       03/01/2019 Dictated By Graciela Amador MD  d: 12/23/2019 04:59:10  t: 12/23/2019 05:01:57  [facility] [id number]  /

## 2020-10-10 NOTE — PLAN OF CARE
CARDIOVASCULAR - ADULT    • Maintains optimal cardiac output and hemodynamic stability Progressing        Patient Centered Care    • Patient preferences are identified and integrated in the patient's plan of care Progressing        Patient/Family Goals Hospital Medicine History & Physical      PCP: Nyla Erazo MD    Date of Admission: 10/9/2020    Date of Service: Pt seen/examined on 10/9/2020 and Admitted to Inpatient with expected LOS greater than two midnights due to medical therapy. Chief Complaint:  Fatigue, dizziness    History Of Present Illness:    72 y.o. male who presented to [facility] with complaints as stated above  Patient with PMH of nonischemic cardiomyopathy EF 40 to 45%, HTN, history of DVT on Coumadin, GI bleed in April 2019, presented to the ED today with complaints of fatigue and dizziness. Patient is pretty much bedbound at home. Wife reports when patient stood up he got lightheaded, and fell back in bed. Also reports patient has not been eating or drinking much. Wife also reports patient stool has been black this past week. Patient is on Coumadin for past history of DVT/PE. She contacted patient's PCP about this, who ordered stool sample for blood, they had trouble getting the test kit until the seventh. Today she called patient's PCP about the patient being very weak, unable to get out of bed, and they were advised to come to the ED. patient also reports diffuse dull aching abdominal pain that is intermittent.      Past Medical History:          Diagnosis Date    Cancer ([address]     kidney    Cardiomyopathy ([address] 08/2019    CHF (congestive heart failure) ([address] 08/2019    Edema of both legs     GI bleed     Hx of blood clots     DVT    Hypertension        Past Surgical History:          Procedure Laterality Date    ABDOMEN SURGERY      Right Kidney Cancer abd. surgery 2014    CARDIAC CATHETERIZATION  09/06/2019    COLONOSCOPY      flexsigmoidoscopy 40yrs ago     COLONOSCOPY N/A 4/9/2019    COLONOSCOPY WITH BIOPSY performed [facility] Curt [facility] at [facility], DIAGNOSTIC      8/2018    KIDNEY SURGERY  4/7/2014    VT EGD TRANSORAL BIOPSY SINGLE/MULTIPLE  9/28/2018    EGD BIOPSY  High Blood Pressure Mother     Anemia Father     Arthritis Father     Cancer Father 80        Ear CA/ tumor removed     Hearing Loss Father     Arthritis Sister     Diabetes Maternal Aunt     Arthritis Maternal Aunt     Early Death Maternal Uncle          in early 42's     Depression Maternal Uncle     Other Maternal Uncle         Suicide    Heart Disease Paternal Aunt     Arthritis Maternal Grandmother        REVIEW OF SYSTEMS:   Pertinent positives as noted in the HPI. All other systems reviewed and negative. PHYSICAL EXAM PERFORMED:    /81   Pulse 83   Temp 99.1 °F (37.3 °C) (Oral)   Resp (!) 32   Ht 5' 4\" (1.626 m)   Wt (!) 350 lb (158.8 kg)   SpO2 95%   BMI 60.08 kg/m²     General appearance: Ill-appearing, no apparent distress, appears stated age and cooperative. HEENT:  Normal cephalic, atraumatic without obvious deformity. Pupils equal, round, and reactive to light. Extra ocular muscles intact. Conjunctivae/corneas clear. Neck: Supple, with full range of motion. No jugular venous distention. Trachea midline. Respiratory:  Normal respiratory effort. Clear to auscultation, bilaterally without Rales/Wheezes/Rhonchi. Cardiovascular:  Regular rate and rhythm with normal S1/S2 without murmurs, rubs or gallops. Abdomen: Soft, mild diffuse tenderness, mildly distended with normal bowel sounds. Musculoskeletal:  No clubbing, cyanosis or edema bilaterally. Full range of motion without deformity. Skin: Skin color, texture, turgor normal.  No rashes or lesions. Neurologic:  Neurovascularly intact without any focal sensory/motor deficits.  Cranial nerves: II-XII intact, grossly non-focal.  Psychiatric:  Alert and oriented, thought content appropriate, normal insight  Capillary Refill: Brisk,< 3 seconds   Peripheral Pulses: +2 palpable, equal bilaterally       Labs:     Recent Labs     10/09/20  1247 10/09/20  1707   WBC 20.6*  --    HGB 12.3* 8.5*   HCT 36.4* 26.5*    --      Recent Labs     10/09/20  1247   *   K 5.1      CO2 19*   BUN 77*   CREATININE 2.7*   CALCIUM 8.4     Recent Labs     10/09/20  1247   AST 18   ALT 13   BILITOT 0.9   ALKPHOS 87     Recent Labs     10/09/20  1247 10/09/20  1811   INR 9.24* 1.93*     Recent Labs     10/09/20  1247   TROPONINI 0.05*       Urinalysis:      Lab Results   Component Value Date    NITRU Negative 10/09/2020    WBCUA 21-50 10/09/2020    BACTERIA 2+ 10/09/2020    RBCUA 11-20 10/09/2020    BLOODU LARGE 10/09/2020    SPECGRAV 1.010 10/09/2020    GLUCOSEU Negative 10/09/2020       Radiology:     I reviewed the chest x-ray, CT abdomen pelvis, CT head personally and also reviewed radiologist interpretation    EKG:  I have reviewed the EKG with the following cardiologist interpretation  Sinus rhythm with 1st degree A-V blockRight bundle branch blockLateral infarct , age undeterminedAbnormal ECGWhen compared with ECG of 06-SEP-2019 07:15,Right bundle branch block has replaced Non-specific intra-ventricular conduction blockLateral infarct is now PresentConfirmed by Rachel Riddle MD, Bianca Hightower ([id number]) on 10/9/2020 1:56:38 PM     XR CHEST PORTABLE   Final Result   No acute process. CT ABDOMEN PELVIS WO CONTRAST Additional Contrast? None   Final Result   Large left retroperitoneal hematoma from presumed spontaneous hemorrhage   particularly given the elevated INR. Prior left nephrectomy. Pneumobilia with apparent moderate contraction of the gallbladder surrounding   the gallstone. This could be the result of prior stone passage and resulting   in incompetence of the ampulla of Vater. No ductal stones seen currently. Large panniculus and abdominal wall hernia. CT HEAD WO CONTRAST   Final Result   1. No acute intracranial abnormality. XR CHEST PORTABLE   Final Result   No acute cardiopulmonary disease.              ASSESSMENT:PLAN:    Spontaneous retroperitoneal hemorrhage  Likely 2/2 supratherapeutic INR of 9.24. With reversal of coagulopathy, and with time, RPH usually tamponades and stops bleeding with conservative therapy not requiring intervention. CT abdomen/pelvis showed no active extravasation (no active bleeder)  -Reverse Coumadin with K Centra, vitamin K until INR <1.5  -Serial H&H, transfuse if Hb <7 or rapidly progressing bleed  -General surgery/interventional radiology consulted and aware  - serial CT to assess size of RPH    Shock   Combination of hemorrhagic due to RPH/GI bleed and/or sepsis due to UTI  -Given IV fluid boluses without adequate response, continue MIVF  -CVC placed in ED, placed on vasopressors to keep map >65  - 1.5L NS bolus given, lactic improved  -We will hookup CBC to CVP to assess intravascular volume deficit  -1 unit PRBC ordered in the ED, f.u serial H&H and transfuse as indicated    Supratherapeutic INR   INR 9 with active hemorrhage.  -Reversal with vitamin K IV and KCentra  -Repeat INR 1.9, would like it to be <1.5, will recheck INR in a.m.  -Hold Coumadin    Acute cystitis  -IV cefepime initiated, follow-up urine cultures    GI bleed - melena x 1 wk  Worsened due to supratherapeutic INR  - Hb dropped from 12 to 8.5 with fluids revealing true degree of anemia  -Hold Coumadin and antiplatelet agents  -Consult GI  -IV PPI twice daily  -Reversal of Coumadin  -H&H every 6 hours and transfuse if Hb <7 or large-volume melena    KEVIN  Baseline Cr 1.0, now 2.7. Likely ATN due to hypotension and hypovolemia. Avoid nephrotoxic meds  Volume replacement with IV crystalline, PRBC as indicated  Maintain map >65  Serial renal panel.   Consider nephrology consult if renal functions do not improve/worsen    Non-ischemic cardiomyopathy  EF 40%, CXR clear, no e/o fluid overload    Benign hypertension   - held bp meds due to shock    Remote history of DVT (deep vein thrombosis)   -coumadin held  -Follows up with heme-onc    Morbid obesity with BMI of 11.5-71.8, adult   Complicating

## (undated) NOTE — ED AVS SNAPSHOT
Addy Maria   MRN: [id number]    Department:  [facility] Emergency Department   Date of Visit:  11/16/2018           Disclosure     Insurance plans vary and the physician(s) referred by the ER may not be covered by your plan.  Please contact yo within the next three months to obtain basic health screening including reassessment of your blood pressure.     IF THERE IS ANY CHANGE OR WORSENING OF YOUR CONDITION, CALL YOUR PRIMARY CARE PHYSICIAN AT ONCE OR RETURN IMMEDIATELY TO THE EMERGENCY DEPARTMEN

## (undated) NOTE — ED AVS SNAPSHOT
Kristyn Limon   MRN: [id number]    Department:  [facility] Emergency Department   Date of Visit:  1/15/2019           Disclosure     Insurance plans vary and the physician(s) referred by the ER may not be covered by your plan.  Please contact you within the next three months to obtain basic health screening including reassessment of your blood pressure.     IF THERE IS ANY CHANGE OR WORSENING OF YOUR CONDITION, CALL YOUR PRIMARY CARE PHYSICIAN AT ONCE OR RETURN IMMEDIATELY TO THE EMERGENCY DEPARTMEN

## (undated) NOTE — ED AVS SNAPSHOT
Bharat Villatoro   MRN: [id number]    Department:  [facility] Emergency Department   Date of Visit:  10/7/2018           Disclosure     Insurance plans vary and the physician(s) referred by the ER may not be covered by your plan.  Please contact you within the next three months to obtain basic health screening including reassessment of your blood pressure.     IF THERE IS ANY CHANGE OR WORSENING OF YOUR CONDITION, CALL YOUR PRIMARY CARE PHYSICIAN AT ONCE OR RETURN IMMEDIATELY TO THE EMERGENCY DEPARTMEN

## (undated) NOTE — IP AVS SNAPSHOT
Patient Demographics     Address  [address] [place] [address] Phone  [phone] [facility])  [phone] (Mobile) *Preferred*      Emergency Contact(s)     Name Relation Home Work Александр Prakash Daughter   [phone]    Khanh Bethea Chew 1 tablet (81 mg total) by mouth daily. SEFERINO Lozada         atorvastatin 80 MG Tabs  Commonly known as:  LIPITOR      Take 1 tablet (80 mg total) by mouth nightly.    SEFERINO Lozada         bisacodyl 10 MG Supp  Commonly known as:  Georgana Jeremy Take 1 tablet (25 mg total) by mouth 3 (three) times daily as needed for Itching. Agata Egan MD         Isosorbide Mononitrate ER 30 MG Tb24  Commonly known as:  IMDUR  Start taking on:  3/30/2019      Take 3 tablets (90 mg total) by mouth daily. Heparin Sodium (Porcine) 5000 UNIT/ML Soln  hydrALAzine HCl 50 MG Tabs  hydrOXYzine HCl 25 MG Tabs  Isosorbide Mononitrate ER 30 MG Tb24           [id number] - MAR ACTION REPORT  (last 24 hrs)    ** SITE UNKNOWN **     Order ID Medication Name Action Time A [id number] Heparin Sodium (Porcine) 5000 UNIT/ML injection 5,000 Units 03/29/19 0600 Given            RIGHT LOWER ABDOMEN     Order ID Medication Name Action Time Action Reason Comments    [id number] epoetin lul-epbx (RETACRIT) 64765 UNIT/ML injection SOLN Slide Review Slide reviewed,morphology review added [facility]            [facility] MORPHOLOGY SCAN [[id number]] (Abnormal)  Resulted: 03/28/19 1733, Result status: Final result   Ordering provider:  Shaylee Bojorquez MD  03/28/19 1732 Resulting lab:  [facility] consciousness. Basically, the patient was noted to be staring at the ceiling while on dialysis. The patient then recovered from this episode, but had no recollection of memory. At that time, her blood pressure was elevated.   The patient had no shortness NECK:  No JVD. No bruit. No lymphadenopathy. LUNGS:  Clear to auscultation. HEART:  S1, S2 heard. No S3, no S4, no murmur, no rub, no gallop. ABDOMEN:  Soft, nontender, nondistended. No hepatosplenomegaly.     EXTREMITIES:  Left below-knee amputat Electronically signed by Bryan Hathaway MD on 3/2/2019  7:00 PM   Attribution Key    Attribution information is not available for this note.                       Consults - MD Consult Notes      Consults signed by Mehran Porter MD at 3/28/2019 The patient herself reported that she has been through a lot and she has been a stress and there is days she is feeling better than others otherwise she is in down mood.   The patient was able to know that she is in the hospital but did not know the name an Performed by Jon Marshall MD at [address] [facility] MAIN OR   • CABG     • HYSTERECTOMY     • OPEN HEART SURGICAL PROFILE  2002       Family History  History reviewed. No pertinent family history.     Social History  Social History    Tobacco Use      Smoking status: Ne Blocker)   amLODIPine Besylate (NORVASC) tab 10 mg 10 mg Oral Daily   aspirin chewable tab 81 mg 81 mg Oral Daily   atorvastatin (LIPITOR) tab 80 mg 80 mg Oral Nightly   NEPHRO-RICKI tab 0.8 mg 1 tablet Oral Daily with breakfast   docusate sodium (COLACE) c PEG 3350 Oral Powd Pack Take 17 g by mouth daily. Senna 8.8 MG/5ML Oral Syrup Take 5 mL by mouth 2 (two) times daily. glycerin, laxative, (GLYCERIN, ADULT,) 2 g Rectal Suppos Place 1 suppository rectally daily as needed.        Allergies  No Known Aller Blood pressure 112/53, pulse 67, temperature 98.2 °F (36.8 °C), temperature source Oral, resp. rate 18, height 66\", weight 156 lb, SpO2 98 %, not currently breastfeeding.     Mental Status Exam:     The patient is alert and oriented to self and to the fact 5.  Follow-up during this admission    Orders This Visit:  Orders Placed This Encounter      CBC With Differential With Platelet      Basic Metabolic Panel (8)      Troponin I      Magnesium      Lipid Panel      REDRAW BMP      Troponin I      Scan slide Insert PICC/Midline Catheter Once      Meds This Visit:  Requested Prescriptions      No prescriptions requested or ordered in this encounter           Mirella Ruano MD  3/28/2019[GA.1]        Electronically signed by Dre Reyes MD on 3/28/20 Card Echo 2d Doppler (cpt=[id number])    Result Date: 3/3/2019                    [facility]* -------------------------------------------------------------------      Transthoracic Echocardiography M-mode, complete 2D, complete spectral Dopp technically limited due to restricted patient mobility and body habitus. Scanning was performed from the parasternal, apical, and subcostal acoustic windows. Study completion:  The patient tolerated the procedure well. There were no complications.  Elmer Aldrich Quality of study:  Image quality was poor. Systemic veins: Inferior vena cava: The vessel was normal in size.  The respirophasic diameter changes were in the normal range (= 50%), consistent with normal central venous pressure. ----------------------------- Value        07/28/2018 Reference  Mitral E-wave peak               107   cm/sec 108        ---------  velocity  Mitral A-wave peak               99.2  cm/sec 103        ---------  velocity  Mitral deceleration time         401   ms     243        -------- Patient is a 81year old female admitted 3/1/2019 for unresponsive episode. Patient's current functional deficits include bed mobility, balance, and transfers, which are below the patient's pre-admission status.   Patient received supine in bed, agreeable • Diabetes [facility])    • Dialysis patient [facility])    • Essential hypertension    • Gout    • High blood pressure    • High cholesterol    • History of stomach ulcers    • Neuropathy    • Renal disorder        Past Surgical History  Past Surgical History:   Proc How much help from another person does the patient currently need. ..   -   Moving to and from a bed to a chair (including a wheelchair)?: Total   -   Need to walk in hospital room?: Total   -   Climbing 3-5 steps with a railing?: Total     AM-PAC Score:  R assist with basic daily self care; is at baseline L ELIZABETHA; per report was requiring use of jo ann lift for functional t/f at NH; at last time therapist worked with pt, she required Max A x2 for bed mobility and seated balance; today pt with a bright affectrachana chair)[AA.2]    Prior Level of [place]: Assist for ADL, IADL, and all mobility with jo ann lift     SUBJECTIVE  I will try to do whatever you want me to baby!     OCCUPATIONAL THERAPY EXAMINATION      OBJECTIVE[AA.1]  Precautions: Bed/chair alarm;Limb a of session/findings; All patient questions and concerns addressed[AA.2]    Patient was able to achieve the following . ..    Patient able to toilet transfer  unable to perform before admission    Patient able to dress lower extremities  unable to perform befo

## (undated) NOTE — ED AVS SNAPSHOT
Ciatlyn He   MRN: [id number]    Department:  [facility] Emergency Department   Date of Visit:  5/19/2018           Disclosure     Insurance plans vary and the physician(s) referred by the ER may not be covered by your plan.  Please contact you within the next three months to obtain basic health screening including reassessment of your blood pressure.     IF THERE IS ANY CHANGE OR WORSENING OF YOUR CONDITION, CALL YOUR PRIMARY CARE PHYSICIAN AT ONCE OR RETURN IMMEDIATELY TO THE EMERGENCY DEPARTMEN

## (undated) NOTE — Clinical Note
Report given to daughter and pt. This RN also called over to [facility] and spoke with Sangeetha Rosa. Updated on plan of care. [facility] called for transportation BLS.  ETA is

## (undated) NOTE — ED AVS SNAPSHOT
Bin Montano   MRN: [id number]    Department:  [facility] Emergency Department   Date of Visit:  8/18/2018           Disclosure     Insurance plans vary and the physician(s) referred by the ER may not be covered by your plan.  Please contact you within the next three months to obtain basic health screening including reassessment of your blood pressure.     IF THERE IS ANY CHANGE OR WORSENING OF YOUR CONDITION, CALL YOUR PRIMARY CARE PHYSICIAN AT ONCE OR RETURN IMMEDIATELY TO THE EMERGENCY DEPARTMEN

## (undated) NOTE — IP AVS SNAPSHOT
Patient Demographics     Address  [address] [place] [address] Phone  [phone] [facility]  [phone] [facility] E-mail Address  [email or web address]      Emergency Contact(s)     Name Relation Home Work Александр Prakash Daughter [phone] ClonazePAM 0.5 MG Tabs  Commonly known as:  KLONOPIN  Next dose due:  Anytime as needed      Take 1 tablet (0.5 mg total) by mouth 2 (two) times daily as needed for Anxiety.    Nawaf Curtis MD         docusate sodium 100 MG Caps  Commonly known as:  Texie Fahad Take 0.5 tablets (12.5 mg total) by mouth 2x Daily(Beta Blocker).    MYLA Brown         NEPHRO-RICKI RX 1 MG Tabs  Next dose due:  8/2/18 morning      Take 1 tablet by mouth daily with breakfast.          PEG 3350 Pack  Commonly known as:  Luciana Sox [id number] TraMADol HCl (ULTRAM) tab 50 mg 08/01/18 1449 Given      [id number] aspirin chewable tab 81 mg 08/01/18 1308 Given      [id number] atorvastatin (LIPITOR) tab 80 mg 07/31/18 2032 Given      [id number] hydrALAzine HCl (APRESOLINE) tab 50 mg 07/31/18 H&P signed by Clotilde Noriega MD at 7/27/2018  7:17 AM  Version 1 of 1    Author:  Clotilde Noriega MD Service:  Internal Medicine Author Type:  Physician    Filed:  7/27/2018  7:17 AM Date of Service:  7/27/2018  6:23 AM Status:  Signed Smoking status: Never Smoker                                                              Smokeless tobacco: Never Used[CL. 2]                        Allergies/Medications:    Allergies:[CL.1] No Known Allergies    Prescriptions Prior to Admission:  TraMADol WBC 6.7 07/26/2018   HGB 10.8 (L) 07/26/2018   HCT 34.2 (L) 07/26/2018    (L) 07/26/2018   CREATSERUM 3.16 (H) 07/26/2018   BUN 17 07/26/2018    07/26/2018   K 3.9 07/26/2018    07/26/2018   CO2 31 07/26/2018    (H) 07/26/2018   C MD [place] at [address]    Report of Consultation    Danisha Eli Patient Status:  Observation    1935 MRN [id number]   Location [facility] 3W/SW Attending Clotilde Noriega, *   Hosp Day # 0 P Heparin Sodium (Porcine) 5000 UNIT/ML injection 5,000 Units 5,000 Units Subcutaneous 2 times per day   levOCARNitine (CARNITOR) 1 GM/10ML solution 330 mg 330 mg Oral Daily       Prescriptions Prior to Admission:  TraMADol HCl 50 MG Oral Tab Take 50 mg by m HGB 10.8 (L) 07/26/2018   HCT 34.2 (L) 07/26/2018    (L) 07/26/2018   CREATSERUM 3.16 (H) 07/26/2018   BUN 17 07/26/2018    07/26/2018   K 3.9 07/26/2018    07/26/2018   CO2 31 07/26/2018    (H) 07/26/2018   CA 9.3 07/26/2018   AL Raquel Crum MD on 7/27/2018 at 7:43               Impression:[WH.1]   Patient is a 80 y/o female with PMH of ESRD on HD, admitted for Chest pain. 1.  ESRD on HD, TTS       2. Anemia of CKD      3. [WH.2]  Acute chest pain[WH.1]      4. [WH.2]  Elev

## (undated) NOTE — ED AVS SNAPSHOT
Mariajose Ford   MRN: [id number]    Department:  [facility] Emergency Department   Date of Visit:  9/5/2018           Disclosure     Insurance plans vary and the physician(s) referred by the ER may not be covered by your plan.  Please contact your within the next three months to obtain basic health screening including reassessment of your blood pressure.     IF THERE IS ANY CHANGE OR WORSENING OF YOUR CONDITION, CALL YOUR PRIMARY CARE PHYSICIAN AT ONCE OR RETURN IMMEDIATELY TO THE EMERGENCY DEPARTMEN

## (undated) NOTE — LETTER
[facility]  Administration of Anesthesia  1. Jamal Rosas, or _________________________________ acting on her behalf, (Patient) (Dependent/Representative) request to receive anesthesia for my pending procedure/operation/treatment.   A ph infections, high spinal block, spinal bleeding, seizure, cardiac arrest and death. 7. AWARENESS: I understand that it is possible (but unlikely) to have explicit memory of events from the operating room while under general anesthesia.   8. ELECTROCONVULSIV unconscious pt /Relationship    My signature below affirms that prior to the time of the procedure, I have explained to the patient and/or his/her guardian, the risks and benefits of undergoing anesthesia, as well as any reasonable alternatives.     _______

## (undated) NOTE — IP AVS SNAPSHOT
[facility]            (For Outpatient Use Only) Initial Admit Date: 3/1/2019   Inpt/Obs Admit Date: Inpt: 3/1/19 / Obs: 03/07/19   Discharge Date:    Mary Boston:  [de-identified]   MRN: [de-identified]   CSN: [id number]        ENCOUNTER  Patient Cl Hospital Account Financial Class: Medicare Advantage    March 29, 2019

## (undated) NOTE — IP AVS SNAPSHOT
[facility]            (For Outpatient Use Only) Initial Admit Date: 7/26/2018   Inpt/Obs Admit Date: Inpt: 7/29/18 / Obs: 07/27/18   Discharge Date:    Christy Quinones:  [de-identified]   MRN: [de-identified]   CSN: [id number]        ENCOUNTER  Patient Hospital Account Financial Class: Medicare Advantage    August 1, 2018

## (undated) NOTE — ED AVS SNAPSHOT
Clayton Renner   MRN: [id number]    Department:  [facility] Emergency Department   Date of Visit:  3/29/2019           Disclosure     Insurance plans vary and the physician(s) referred by the ER may not be covered by your plan.  Please contact you within the next three months to obtain basic health screening including reassessment of your blood pressure.     IF THERE IS ANY CHANGE OR WORSENING OF YOUR CONDITION, CALL YOUR PRIMARY CARE PHYSICIAN AT ONCE OR RETURN IMMEDIATELY TO THE EMERGENCY DEPARTMEN

## (undated) NOTE — ED AVS SNAPSHOT
Lauren Patel   MRN: [id number]    Department:  [facility] Emergency Department   Date of Visit:  11/13/2018           Disclosure     Insurance plans vary and the physician(s) referred by the ER may not be covered by your plan.  Please contact yo within the next three months to obtain basic health screening including reassessment of your blood pressure.     IF THERE IS ANY CHANGE OR WORSENING OF YOUR CONDITION, CALL YOUR PRIMARY CARE PHYSICIAN AT ONCE OR RETURN IMMEDIATELY TO THE EMERGENCY DEPARTMEN